# Patient Record
Sex: FEMALE | Race: WHITE | NOT HISPANIC OR LATINO | Employment: UNEMPLOYED | URBAN - METROPOLITAN AREA
[De-identification: names, ages, dates, MRNs, and addresses within clinical notes are randomized per-mention and may not be internally consistent; named-entity substitution may affect disease eponyms.]

---
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
## 2023-02-16 RX ORDER — NORETHINDRONE ACETATE AND ETHINYL ESTRADIOL 1.5-30(21)
1 KIT ORAL DAILY
Qty: 28 TABLET | Refills: 1 | Status: SHIPPED | OUTPATIENT
Start: 2023-02-16 | End: 2023-03-21

## 2023-03-21 ENCOUNTER — OFFICE VISIT (OUTPATIENT)
Dept: OBSTETRICS AND GYNECOLOGY | Facility: CLINIC | Age: 18
End: 2023-03-21
Payer: MEDICAID

## 2023-03-21 VITALS
TEMPERATURE: 97 F | BODY MASS INDEX: 22.66 KG/M2 | DIASTOLIC BLOOD PRESSURE: 82 MMHG | SYSTOLIC BLOOD PRESSURE: 118 MMHG | HEIGHT: 61 IN | WEIGHT: 120 LBS

## 2023-03-21 DIAGNOSIS — Z30.9 ENCOUNTER FOR CONTRACEPTIVE MANAGEMENT, UNSPECIFIED TYPE: Primary | ICD-10-CM

## 2023-03-21 DIAGNOSIS — Z30.41 ORAL CONTRACEPTIVE USE: ICD-10-CM

## 2023-03-21 PROCEDURE — 1160F PR REVIEW ALL MEDS BY PRESCRIBER/CLIN PHARMACIST DOCUMENTED: ICD-10-PCS | Mod: CPTII,,, | Performed by: NURSE PRACTITIONER

## 2023-03-21 PROCEDURE — 1159F MED LIST DOCD IN RCRD: CPT | Mod: CPTII,,, | Performed by: NURSE PRACTITIONER

## 2023-03-21 PROCEDURE — 3079F DIAST BP 80-89 MM HG: CPT | Mod: CPTII,,, | Performed by: NURSE PRACTITIONER

## 2023-03-21 PROCEDURE — 3079F PR MOST RECENT DIASTOLIC BLOOD PRESSURE 80-89 MM HG: ICD-10-PCS | Mod: CPTII,,, | Performed by: NURSE PRACTITIONER

## 2023-03-21 PROCEDURE — 99395 PREV VISIT EST AGE 18-39: CPT | Mod: ,,, | Performed by: NURSE PRACTITIONER

## 2023-03-21 PROCEDURE — 3074F PR MOST RECENT SYSTOLIC BLOOD PRESSURE < 130 MM HG: ICD-10-PCS | Mod: CPTII,,, | Performed by: NURSE PRACTITIONER

## 2023-03-21 PROCEDURE — 3008F PR BODY MASS INDEX (BMI) DOCUMENTED: ICD-10-PCS | Mod: CPTII,,, | Performed by: NURSE PRACTITIONER

## 2023-03-21 PROCEDURE — 3008F BODY MASS INDEX DOCD: CPT | Mod: CPTII,,, | Performed by: NURSE PRACTITIONER

## 2023-03-21 PROCEDURE — 1160F RVW MEDS BY RX/DR IN RCRD: CPT | Mod: CPTII,,, | Performed by: NURSE PRACTITIONER

## 2023-03-21 PROCEDURE — 1159F PR MEDICATION LIST DOCUMENTED IN MEDICAL RECORD: ICD-10-PCS | Mod: CPTII,,, | Performed by: NURSE PRACTITIONER

## 2023-03-21 PROCEDURE — 3074F SYST BP LT 130 MM HG: CPT | Mod: CPTII,,, | Performed by: NURSE PRACTITIONER

## 2023-03-21 PROCEDURE — 99395 PR PREVENTIVE VISIT,EST,18-39: ICD-10-PCS | Mod: ,,, | Performed by: NURSE PRACTITIONER

## 2023-03-21 RX ORDER — NORELGESTROMIN AND ETHINYL ESTRADIOL 35; 150 UG/MG; UG/MG
1 PATCH TRANSDERMAL WEEKLY
Qty: 3 PATCH | Refills: 2 | Status: SHIPPED | OUTPATIENT
Start: 2023-03-21 | End: 2023-05-11 | Stop reason: SDUPTHER

## 2023-05-11 ENCOUNTER — OFFICE VISIT (OUTPATIENT)
Dept: OBSTETRICS AND GYNECOLOGY | Facility: CLINIC | Age: 18
End: 2023-05-11
Payer: MEDICAID

## 2023-05-11 VITALS
WEIGHT: 116.19 LBS | TEMPERATURE: 98 F | HEIGHT: 61 IN | BODY MASS INDEX: 21.94 KG/M2 | DIASTOLIC BLOOD PRESSURE: 68 MMHG | SYSTOLIC BLOOD PRESSURE: 98 MMHG

## 2023-05-11 DIAGNOSIS — Z11.3 SCREEN FOR SEXUALLY TRANSMITTED DISEASES: ICD-10-CM

## 2023-05-11 DIAGNOSIS — Z01.419 ROUTINE GYNECOLOGICAL EXAMINATION: Primary | ICD-10-CM

## 2023-05-11 DIAGNOSIS — Z30.45 ENCOUNTER FOR SURVEILLANCE OF TRANSDERMAL PATCH HORMONAL CONTRACEPTIVE DEVICE: ICD-10-CM

## 2023-05-11 PROCEDURE — 3008F PR BODY MASS INDEX (BMI) DOCUMENTED: ICD-10-PCS | Mod: CPTII,,, | Performed by: NURSE PRACTITIONER

## 2023-05-11 PROCEDURE — 3074F SYST BP LT 130 MM HG: CPT | Mod: CPTII,,, | Performed by: NURSE PRACTITIONER

## 2023-05-11 PROCEDURE — 99395 PREV VISIT EST AGE 18-39: CPT | Mod: ,,, | Performed by: NURSE PRACTITIONER

## 2023-05-11 PROCEDURE — 1160F RVW MEDS BY RX/DR IN RCRD: CPT | Mod: CPTII,,, | Performed by: NURSE PRACTITIONER

## 2023-05-11 PROCEDURE — 3008F BODY MASS INDEX DOCD: CPT | Mod: CPTII,,, | Performed by: NURSE PRACTITIONER

## 2023-05-11 PROCEDURE — 1159F PR MEDICATION LIST DOCUMENTED IN MEDICAL RECORD: ICD-10-PCS | Mod: CPTII,,, | Performed by: NURSE PRACTITIONER

## 2023-05-11 PROCEDURE — 1160F PR REVIEW ALL MEDS BY PRESCRIBER/CLIN PHARMACIST DOCUMENTED: ICD-10-PCS | Mod: CPTII,,, | Performed by: NURSE PRACTITIONER

## 2023-05-11 PROCEDURE — 3078F PR MOST RECENT DIASTOLIC BLOOD PRESSURE < 80 MM HG: ICD-10-PCS | Mod: CPTII,,, | Performed by: NURSE PRACTITIONER

## 2023-05-11 PROCEDURE — 3078F DIAST BP <80 MM HG: CPT | Mod: CPTII,,, | Performed by: NURSE PRACTITIONER

## 2023-05-11 PROCEDURE — 3074F PR MOST RECENT SYSTOLIC BLOOD PRESSURE < 130 MM HG: ICD-10-PCS | Mod: CPTII,,, | Performed by: NURSE PRACTITIONER

## 2023-05-11 PROCEDURE — 99395 PR PREVENTIVE VISIT,EST,18-39: ICD-10-PCS | Mod: ,,, | Performed by: NURSE PRACTITIONER

## 2023-05-11 PROCEDURE — 1159F MED LIST DOCD IN RCRD: CPT | Mod: CPTII,,, | Performed by: NURSE PRACTITIONER

## 2023-05-11 RX ORDER — NORELGESTROMIN AND ETHINYL ESTRADIOL 35; 150 UG/MG; UG/MG
1 PATCH TRANSDERMAL WEEKLY
Qty: 3 PATCH | Refills: 11 | Status: SHIPPED | OUTPATIENT
Start: 2023-05-11 | End: 2024-02-01

## 2023-05-11 NOTE — PROGRESS NOTES
Chief Complaint: Annual exam    Chief Complaint   Patient presents with    Well Woman       HPI:   17 y.o. F  presents for an annual gyn exam.    Currently using xulane patch for contraception with no complaints.    LMP: 2023  Gardasil: x2    FmHx: MGM with ovarian cancer, negative for breast, uterine, and colon cancers.     LMP:  23  Frequency: q month   Cycle Length: 5-7 days   Flow:moderate   Intermenstrual Bleeding: No  Postcoital Bleeding: No  Dysmenorrhea: Yes  Sexually Active: yes   Dyspareunia: No  Contraception: Xulane patches   H/o STI: No   Last pap: no hx  Gardasil: x2             Family History   Problem Relation Age of Onset    Ovarian cancer Maternal Grandmother     Breast cancer Neg Hx     Uterine cancer Neg Hx     Colon cancer Neg Hx        History reviewed. No pertinent past medical history.  History reviewed. No pertinent surgical history.    Current Outpatient Medications:     norelgestromin-ethinyl estradiol 150-35 mcg/24 hr, Place 1 patch onto the skin once a week. Apply 1 patch by transdermal route weekly for 3 weeks monthly, Disp: 3 patch, Rfl: 2    Review of patient's allergies indicates:  No Known Allergies    Social History     Tobacco Use    Smoking status: Never    Smokeless tobacco: Never   Substance Use Topics    Alcohol use: Not Currently    Drug use: Never       Review of Systems   Constitutional:  Negative for appetite change, chills, fatigue, fever and unexpected weight change.   Gastrointestinal:  Negative for abdominal pain, blood in stool, constipation, diarrhea, nausea, vomiting and reflux.   Genitourinary:  Negative for bladder incontinence, decreased libido, dysmenorrhea, dyspareunia, dysuria, flank pain, frequency, genital sores, hematuria, hot flashes, menorrhagia, menstrual problem, pelvic pain, urgency, vaginal bleeding, vaginal discharge, vaginal pain, urinary incontinence, postcoital bleeding, postmenopausal bleeding, vaginal dryness and vaginal odor.  "  Integumentary:  Negative for rash, acne, hair changes and mole/lesion.   Neurological:  Negative for headaches.      Physical Exam:   Vitals:    05/11/23 1105   BP: 98/68   BP Location: Right arm   Temp: 97.5 °F (36.4 °C)   Weight: 52.7 kg (116 lb 3.2 oz)   Height: 5' 1.42" (1.56 m)       Body mass index is 21.66 kg/m².    Physical Exam  Constitutional:       Appearance: She is well-developed.   Neck:      Thyroid: No thyroid mass or thyroid tenderness.   Cardiovascular:      Rate and Rhythm: Normal rate and regular rhythm.      Pulses: Normal pulses.      Heart sounds: Normal heart sounds. No murmur heard.  Pulmonary:      Effort: No respiratory distress.      Breath sounds: Normal breath sounds. No decreased breath sounds, wheezing, rhonchi or rales.   Abdominal:      General: Bowel sounds are normal.      Palpations: There is no mass.      Tenderness: There is no abdominal tenderness.      Hernia: No hernia is present.   Musculoskeletal:      Cervical back: No edema.      Right lower leg: No edema.      Left lower leg: No edema.   Lymphadenopathy:      Head:      Right side of head: No submandibular or preauricular adenopathy.      Left side of head: No submandibular or preauricular adenopathy.      Upper Body:      Right upper body: No supraclavicular or axillary adenopathy.      Left upper body: No supraclavicular or axillary adenopathy.   Skin:     General: Skin is warm and dry.      Coloration: Skin is not pale.      Findings: No erythema or rash.   Neurological:      Mental Status: She is alert and oriented to person, place, and time.   Psychiatric:         Mood and Affect: Mood normal. Mood is not anxious or depressed.         Behavior: Behavior normal.         Thought Content: Thought content normal. Thought content does not include homicidal or suicidal ideation. Thought content does not include homicidal or suicidal plan.         Assessment:     There is no problem list on file for this " patient.      Health Maintenance Due   Topic Date Due    Hepatitis C Screening  Never done    Lipid Panel  Never done    COVID-19 Vaccine (1) Never done    HIV Screening  Never done    Chlamydia Screening  Never done     Health Maintenance Topics with due status: Not Due       Topic Last Completion Date    TETANUS VACCINE 07/29/2014    Influenza Vaccine 01/15/2015         Plan:    Laury was seen today for well woman.    Diagnoses and all orders for this visit:    Routine gynecological examination  No PAP  Uro Swab GC/CZ/TV  Counseled regarding safe sex practices and prevention of STD's  Discussed contraceptive options.  Discussed HPV vaccine  Advised avoidance of tobacco, alcohol, and illicit drug use  Seat belt  RTC 1 yr   Encounter for surveillance of transdermal patch hormonal contraceptive device  Renew xulane  - Explained common options for contraception including natural family planning, barrier methods, depo-provera, ocps,  patch, nuvaring, IUD, Nexplanon, and sterilization.     - Discussed that pills should be taken at the same time every day to minimize breakthrough bleeding and to decrease failure rate.     - Advised patient that smoking is harmful due to increased risks of stroke, heart attack and blood clots when taking pills. Patient to contact us immediately if she experiences severe abdominal pain, severe chest pain, severe headaches, eye-visual changes, severe leg pain or SOB.     - Discussed that birth control, such as pills, Nuva Ring , Patch or Depo Provera, Nexplanon, IUDs do not protect against STDs.     Pt is content with xulane. Refills sent to pharmacy.

## 2023-05-16 ENCOUNTER — TELEPHONE (OUTPATIENT)
Dept: OBSTETRICS AND GYNECOLOGY | Facility: CLINIC | Age: 18
End: 2023-05-16

## 2023-05-16 NOTE — PROGRESS NOTES
+ chlamydia, please notify patient, she and partner should be treated with Doxycycline 100 mg po bid x 7 days unless pregnant, MARISA 4 weeks

## 2023-05-16 NOTE — TELEPHONE ENCOUNTER
----- Message from Lisy Rico NP sent at 5/16/2023  4:03 PM CDT -----  + chlamydia, please notify patient, she and partner should be treated with Doxycycline 100 mg po bid x 7 days unless pregnant, MARISA 4 weeks

## 2023-05-17 ENCOUNTER — PATIENT MESSAGE (OUTPATIENT)
Dept: OBSTETRICS AND GYNECOLOGY | Facility: CLINIC | Age: 18
End: 2023-05-17
Payer: MEDICAID

## 2023-05-17 DIAGNOSIS — A74.9 CHLAMYDIA: Primary | ICD-10-CM

## 2023-05-17 RX ORDER — DOXYCYCLINE 100 MG/1
100 CAPSULE ORAL 2 TIMES DAILY
Qty: 14 CAPSULE | Refills: 0 | Status: SHIPPED | OUTPATIENT
Start: 2023-05-17 | End: 2023-05-24

## 2023-05-17 NOTE — TELEPHONE ENCOUNTER
Will call in meds per SL treating pt and partner. [facility] info [address]. [facility] [place]

## 2023-06-28 NOTE — PROGRESS NOTES
Chief Complaint:     Chief Complaint   Patient presents with    MARISA for +Chlamydia.  Treated 23.  LMP: 23         HPI:   17 y.o.  presents for a MARISA on +CZ on 2023. Treated on 2023 with Doxy 100mg BID x2 weeks.     LMP:  23  Frequency: q month   Cycle Length: 5-7 days   Flow:moderate   Intermenstrual Bleeding: No  Postcoital Bleeding: No  Dysmenorrhea: Yes, Mild  Sexually Active: yes   Dyspareunia: No  Contraception: Xulane patches   H/o STI: Chlamydia  Last pap: no hx  Gardasil: 2/2               Current Outpatient Medications:     norelgestromin-ethinyl estradiol 150-35 mcg/24 hr, Place 1 patch onto the skin once a week. Apply 1 patch by transdermal route weekly for 3 weeks monthly, Disp: 3 patch, Rfl: 11    Review of patient's allergies indicates:  No Known Allergies    Social History     Tobacco Use    Smoking status: Never     Passive exposure: Current    Smokeless tobacco: Never   Substance Use Topics    Alcohol use: Yes    Drug use: Never       Review of Systems   Constitutional:  Negative for appetite change, chills, fatigue, fever and unexpected weight change.   Gastrointestinal:  Negative for abdominal pain, blood in stool, constipation, diarrhea, nausea, vomiting and reflux.   Genitourinary:  Negative for bladder incontinence, decreased libido, dysmenorrhea, dyspareunia, dysuria, flank pain, frequency, genital sores, hematuria, hot flashes, menorrhagia, menstrual problem, pelvic pain, urgency, vaginal bleeding, vaginal discharge, vaginal pain, urinary incontinence, postcoital bleeding, postmenopausal bleeding, vaginal dryness and vaginal odor.   Integumentary:  Negative for rash, acne, hair changes and mole/lesion.   Neurological:  Negative for headaches.        Physical Exam:   Vitals:    23 1101   BP: 96/68   BP Location: Left arm   Patient Position: Sitting   BP Method: Medium (Manual)   Temp: 97.7 °F (36.5 °C)   TempSrc: Temporal   Weight: 50.4 kg (111 lb 1.8  "oz)   Height: 5' 1.42" (1.56 m)       Body mass index is 20.71 kg/m².    Physical Exam  Constitutional:       Appearance: She is well-developed.   Abdominal:      General: Abdomen is flat. Bowel sounds are normal. There is no distension.      Palpations: Abdomen is soft. There is no mass.      Tenderness: There is no abdominal tenderness.      Hernia: No hernia is present.   Genitourinary:     Vagina: No vaginal discharge, erythema, tenderness or bleeding.      Cervix: No cervical motion tenderness or discharge.      Uterus: Not enlarged and not tender.       Adnexa:         Right: No mass, tenderness or fullness.          Left: No mass, tenderness or fullness.     Neurological:      Mental Status: She is alert and oriented to person, place, and time.   Psychiatric:         Attention and Perception: Attention normal.         Mood and Affect: Mood normal.         Assessment:     There is no problem list on file for this patient.      Health Maintenance Due   Topic Date Due    Hepatitis C Screening  Never done    Lipid Panel  Never done    COVID-19 Vaccine (1) Never done    HIV Screening  Never done    Chlamydia Screening  Never done     Health Maintenance Topics with due status: Not Due       Topic Last Completion Date    TETANUS VACCINE 07/29/2014    Influenza Vaccine 01/15/2015           Plan:    Laury was seen today for marisa for +chlamydia.  treated 5/16/23.  lmp: 6/23/23.    Diagnoses and all orders for this visit:    Chlamydia  -     MDL Sendout Test    Tested +: 05/11/2023 +     Treated: 05/25/2023 Treated with Doxy 100mg     MARISA today: One Swab GC/CZ/TV            "

## 2023-06-29 ENCOUNTER — OFFICE VISIT (OUTPATIENT)
Dept: OBSTETRICS AND GYNECOLOGY | Facility: CLINIC | Age: 18
End: 2023-06-29
Payer: MEDICAID

## 2023-06-29 VITALS
WEIGHT: 111.13 LBS | TEMPERATURE: 98 F | DIASTOLIC BLOOD PRESSURE: 68 MMHG | HEIGHT: 61 IN | SYSTOLIC BLOOD PRESSURE: 96 MMHG | BODY MASS INDEX: 20.98 KG/M2

## 2023-06-29 DIAGNOSIS — A74.9 CHLAMYDIA: Primary | ICD-10-CM

## 2023-06-29 PROCEDURE — 3008F BODY MASS INDEX DOCD: CPT | Mod: CPTII,,, | Performed by: NURSE PRACTITIONER

## 2023-06-29 PROCEDURE — 1159F MED LIST DOCD IN RCRD: CPT | Mod: CPTII,,, | Performed by: NURSE PRACTITIONER

## 2023-06-29 PROCEDURE — 3008F PR BODY MASS INDEX (BMI) DOCUMENTED: ICD-10-PCS | Mod: CPTII,,, | Performed by: NURSE PRACTITIONER

## 2023-06-29 PROCEDURE — 99213 PR OFFICE/OUTPT VISIT, EST, LEVL III, 20-29 MIN: ICD-10-PCS | Mod: ,,, | Performed by: NURSE PRACTITIONER

## 2023-06-29 PROCEDURE — 3078F PR MOST RECENT DIASTOLIC BLOOD PRESSURE < 80 MM HG: ICD-10-PCS | Mod: CPTII,,, | Performed by: NURSE PRACTITIONER

## 2023-06-29 PROCEDURE — 1159F PR MEDICATION LIST DOCUMENTED IN MEDICAL RECORD: ICD-10-PCS | Mod: CPTII,,, | Performed by: NURSE PRACTITIONER

## 2023-06-29 PROCEDURE — 3074F SYST BP LT 130 MM HG: CPT | Mod: CPTII,,, | Performed by: NURSE PRACTITIONER

## 2023-06-29 PROCEDURE — 1160F RVW MEDS BY RX/DR IN RCRD: CPT | Mod: CPTII,,, | Performed by: NURSE PRACTITIONER

## 2023-06-29 PROCEDURE — 3074F PR MOST RECENT SYSTOLIC BLOOD PRESSURE < 130 MM HG: ICD-10-PCS | Mod: CPTII,,, | Performed by: NURSE PRACTITIONER

## 2023-06-29 PROCEDURE — 1160F PR REVIEW ALL MEDS BY PRESCRIBER/CLIN PHARMACIST DOCUMENTED: ICD-10-PCS | Mod: CPTII,,, | Performed by: NURSE PRACTITIONER

## 2023-06-29 PROCEDURE — 99213 OFFICE O/P EST LOW 20 MIN: CPT | Mod: ,,, | Performed by: NURSE PRACTITIONER

## 2023-06-29 PROCEDURE — 3078F DIAST BP <80 MM HG: CPT | Mod: CPTII,,, | Performed by: NURSE PRACTITIONER

## 2023-08-18 NOTE — PROGRESS NOTES
Chief Complaint     Absent Menses (UPT confirmation)    HPI:     Patient is a 18 y.o.  presents today for UPT confirmation   UPT today in clinic: positive     LMP: 23  Frequency: q 28 days   Cycle Length: 5-7 days   Flow:moderate   Intermenstrual Bleeding: No  Postcoital Bleeding: No  Dysmenorrhea: Yes, Mild  Sexually Active: yes   Dyspareunia: No  Contraception: none  H/o STI: Chlamydia  Last pap: no hx  Gardasil: 2/3          Past Medical History:   Diagnosis Date    Chlamydia infection        History reviewed. No pertinent surgical history.    Family History   Problem Relation Age of Onset    Ovarian cancer Maternal Grandmother     Breast cancer Neg Hx     Uterine cancer Neg Hx     Colon cancer Neg Hx     Cervical cancer Neg Hx        OB History          0    Para   0    Term   0       0    AB   0    Living   0         SAB   0    IAB   0    Ectopic   0    Multiple   0    Live Births   0                 Current Outpatient Medications on File Prior to Visit   Medication Sig Dispense Refill    prenatal no115/iron/folic acid (PRENATAL 19 ORAL) Take 1 tablet by mouth once daily.      norelgestromin-ethinyl estradiol 150-35 mcg/24 hr Place 1 patch onto the skin once a week. Apply 1 patch by transdermal route weekly for 3 weeks monthly (Patient not taking: Reported on 2023) 3 patch 11     No current facility-administered medications on file prior to visit.       Review of Systems:       Review of Systems   Constitutional:  Negative for chills and fever.   Gastrointestinal:  Negative for abdominal pain, constipation and diarrhea.   Genitourinary:  Negative for bladder incontinence, decreased libido, dysmenorrhea, dyspareunia, dysuria, flank pain, frequency, genital sores, hematuria, hot flashes, menorrhagia, menstrual problem, pelvic pain, urgency, vaginal bleeding, vaginal discharge, vaginal pain, urinary incontinence, postcoital bleeding, postmenopausal bleeding, vaginal dryness and  "vaginal odor.        Physical Exam:    /66 (BP Location: Left arm, Patient Position: Sitting)   Ht 5' 1" (1.549 m)   Wt 51.2 kg (112 lb 12.8 oz)   LMP 07/14/2023 (Exact Date)   BMI 21.31 kg/m²     Physical Exam     deferred  Assessment:   1. Positive pregnancy test    2. Missed menses  -     POCT Urine Pregnancy             Plan:     Prenatal counseling  Tobacco avoidance/cessation  Illicit drug avoidance  PNV, folic acid     RTC New OB 3 weeks      "

## 2023-08-22 ENCOUNTER — OFFICE VISIT (OUTPATIENT)
Dept: OBSTETRICS AND GYNECOLOGY | Facility: CLINIC | Age: 18
End: 2023-08-22
Payer: MEDICAID

## 2023-08-22 VITALS
WEIGHT: 112.81 LBS | DIASTOLIC BLOOD PRESSURE: 66 MMHG | SYSTOLIC BLOOD PRESSURE: 112 MMHG | BODY MASS INDEX: 21.3 KG/M2 | HEIGHT: 61 IN

## 2023-08-22 DIAGNOSIS — N92.6 MISSED MENSES: ICD-10-CM

## 2023-08-22 DIAGNOSIS — Z32.01 POSITIVE PREGNANCY TEST: Primary | ICD-10-CM

## 2023-08-22 LAB
B-HCG UR QL: POSITIVE
CTP QC/QA: YES

## 2023-08-22 PROCEDURE — 81025 URINE PREGNANCY TEST: CPT | Mod: ,,, | Performed by: OBSTETRICS & GYNECOLOGY

## 2023-08-22 PROCEDURE — 3078F DIAST BP <80 MM HG: CPT | Mod: CPTII,,, | Performed by: OBSTETRICS & GYNECOLOGY

## 2023-08-22 PROCEDURE — 1159F PR MEDICATION LIST DOCUMENTED IN MEDICAL RECORD: ICD-10-PCS | Mod: CPTII,,, | Performed by: OBSTETRICS & GYNECOLOGY

## 2023-08-22 PROCEDURE — 81025 POCT URINE PREGNANCY: ICD-10-PCS | Mod: ,,, | Performed by: OBSTETRICS & GYNECOLOGY

## 2023-08-22 PROCEDURE — 99203 OFFICE O/P NEW LOW 30 MIN: CPT | Mod: TH,,, | Performed by: OBSTETRICS & GYNECOLOGY

## 2023-08-22 PROCEDURE — 3008F BODY MASS INDEX DOCD: CPT | Mod: CPTII,,, | Performed by: OBSTETRICS & GYNECOLOGY

## 2023-08-22 PROCEDURE — 3074F PR MOST RECENT SYSTOLIC BLOOD PRESSURE < 130 MM HG: ICD-10-PCS | Mod: CPTII,,, | Performed by: OBSTETRICS & GYNECOLOGY

## 2023-08-22 PROCEDURE — 1159F MED LIST DOCD IN RCRD: CPT | Mod: CPTII,,, | Performed by: OBSTETRICS & GYNECOLOGY

## 2023-08-22 PROCEDURE — 3074F SYST BP LT 130 MM HG: CPT | Mod: CPTII,,, | Performed by: OBSTETRICS & GYNECOLOGY

## 2023-08-22 PROCEDURE — 3008F PR BODY MASS INDEX (BMI) DOCUMENTED: ICD-10-PCS | Mod: CPTII,,, | Performed by: OBSTETRICS & GYNECOLOGY

## 2023-08-22 PROCEDURE — 3078F PR MOST RECENT DIASTOLIC BLOOD PRESSURE < 80 MM HG: ICD-10-PCS | Mod: CPTII,,, | Performed by: OBSTETRICS & GYNECOLOGY

## 2023-08-22 PROCEDURE — 99203 PR OFFICE/OUTPT VISIT, NEW, LEVL III, 30-44 MIN: ICD-10-PCS | Mod: TH,,, | Performed by: OBSTETRICS & GYNECOLOGY

## 2023-09-04 ENCOUNTER — PATIENT MESSAGE (OUTPATIENT)
Dept: OBSTETRICS AND GYNECOLOGY | Facility: CLINIC | Age: 18
End: 2023-09-04
Payer: MEDICAID

## 2023-09-05 ENCOUNTER — TELEPHONE (OUTPATIENT)
Dept: OBSTETRICS AND GYNECOLOGY | Facility: CLINIC | Age: 18
End: 2023-09-05
Payer: MEDICAID

## 2023-09-06 ENCOUNTER — HOSPITAL ENCOUNTER (EMERGENCY)
Facility: HOSPITAL | Age: 18
Discharge: HOME | End: 2023-09-07
Attending: FAMILY MEDICINE
Payer: MEDICAID

## 2023-09-06 VITALS
RESPIRATION RATE: 18 BRPM | WEIGHT: 112 LBS | HEIGHT: 60 IN | DIASTOLIC BLOOD PRESSURE: 74 MMHG | BODY MASS INDEX: 21.99 KG/M2 | SYSTOLIC BLOOD PRESSURE: 125 MMHG | HEART RATE: 91 BPM | OXYGEN SATURATION: 98 % | TEMPERATURE: 99 F

## 2023-09-06 DIAGNOSIS — O20.9 VAGINAL BLEEDING IN PREGNANCY, FIRST TRIMESTER: Primary | ICD-10-CM

## 2023-09-06 PROCEDURE — 81001 URINALYSIS AUTO W/SCOPE: CPT | Performed by: FAMILY MEDICINE

## 2023-09-06 PROCEDURE — 84702 CHORIONIC GONADOTROPIN TEST: CPT | Performed by: FAMILY MEDICINE

## 2023-09-06 PROCEDURE — 36415 COLL VENOUS BLD VENIPUNCTURE: CPT | Performed by: FAMILY MEDICINE

## 2023-09-06 PROCEDURE — 99284 EMERGENCY DEPT VISIT MOD MDM: CPT

## 2023-09-07 LAB
APPEARANCE UR: CLEAR
B-HCG FREE SERPL-ACNC: NORMAL MIU/ML
BACTERIA #/AREA URNS AUTO: ABNORMAL /HPF
BILIRUB UR QL STRIP.AUTO: NEGATIVE
COLOR UR: YELLOW
GLUCOSE UR QL STRIP.AUTO: NEGATIVE
KETONES UR QL STRIP.AUTO: NEGATIVE
LEUKOCYTE ESTERASE UR QL STRIP.AUTO: NEGATIVE
NITRITE UR QL STRIP.AUTO: NEGATIVE
PH UR STRIP.AUTO: 6.5 [PH]
PROT UR QL STRIP.AUTO: NEGATIVE
RBC #/AREA URNS AUTO: ABNORMAL /HPF
RBC UR QL AUTO: ABNORMAL
SP GR UR STRIP.AUTO: 1.01 (ref 1–1.03)
SQUAMOUS #/AREA URNS AUTO: ABNORMAL /HPF
UROBILINOGEN UR STRIP-ACNC: 1
WBC #/AREA URNS AUTO: ABNORMAL /HPF

## 2023-09-07 NOTE — PROGRESS NOTES
Chief Complaint:  Initial Prenatal Visit (LMP 23)    History of Present Illness:  Laury Garrett is a 18 y.o. year old  presents for her new ob, 8w3d by TVUS done at [facility] on 23- 7w6d, TAHIRA 24. Seen at ER due to vaginal bleeding following intercourse. Denies pain,  bleeding since.     23  FINDINGS:  A gestational sac with a normal appearing decidual reaction is noted high within the endometrial cavity .  A yolk sac and fetal pole is noted within the gestational sac with a crown-rump length of 1.48 cm (7 weeks 6 days).  The fetus was reported to demonstrate normal spontaneous motility with a fetal heart rate of 157 beats per minute.  No definite fetal or maternal abnormalities are noted.  Impression:  1. A single intrauterine pregnancy is present with a mean sonographic gestational age of 7 weeks 6 days.  No significant abnormalities are noted.       Review of Systems:  General/Constitutional: Chills denies. Fatigue/weakness denies. Fever denies. Night sweats denies. Hot flashes denies.   Respiratory: Cough denies. Hemoptysis denies. SOB denies. Sputum production denies. Wheezing denies.   Cardiovascular: Chest pain denies. Dizziness denies. Palpitations denies. Swelling in hands/feet denies.    Gastrointestinal: Abdominal pain denies. Blood in stool denies. Constipation denies. Diarrhea denies. Heartburn denies. Nausea denies. Vomiting denies.   Genitourinary: Incontinence denies. Blood in urine denies. Frequent urination denies. Painful urination denies.  Urinary urgency denies.  Nocturia denies.   Gynecologic: Irregular menses denies. Heavy bleeding denies. Painful menses denies. Vaginal discharge denies. Vaginal odor denies. Vaginal itching denies. Vaginal lesion denies.  Pelvic pain denies. Decreased libido denies. Vulvar lesion denies. Prolapse of genital organs denies. Painful intercourse denies. Postcoital bleeding denies.   Psychiatric: Depression denies. Anxiety denies.     OB  History    Para Term  AB Living   1 0 0 0 0 0   SAB IAB Ectopic Multiple Live Births   0 0 0 0 0      # 1 - Date: None, Sex: None, Weight: None, GA: None, Delivery: None, Apgar1: None, Apgar5: None, Living: None, Birth Comments: None        Past Medical History:   Diagnosis Date    Chlamydia infection          Current Outpatient Medications:     prenatal no115/iron/folic acid (PRENATAL 19 ORAL), Take 1 tablet by mouth once daily., Disp: , Rfl:     norelgestromin-ethinyl estradiol 150-35 mcg/24 hr, Place 1 patch onto the skin once a week. Apply 1 patch by transdermal route weekly for 3 weeks monthly (Patient not taking: Reported on 2023), Disp: 3 patch, Rfl: 11    Review of patient's allergies indicates:  No Known Allergies    History reviewed. No pertinent surgical history.    Family History   Problem Relation Age of Onset    Ovarian cancer Maternal Grandmother     Breast cancer Neg Hx     Uterine cancer Neg Hx     Colon cancer Neg Hx     Cervical cancer Neg Hx        Social History     Socioeconomic History    Marital status: Single   Tobacco Use    Smoking status: Never     Passive exposure: Current    Smokeless tobacco: Never   Substance and Sexual Activity    Alcohol use: Not Currently    Drug use: Never    Sexual activity: Yes     Partners: Male     Birth control/protection: None     Comment: STI : Chlamydia       Physical Exam:  /64 (BP Location: Right arm, Patient Position: Sitting, BP Method: Medium (Manual))   Temp 97.2 °F (36.2 °C) (Temporal)   LMP 2023 (Approximate)     Chaperone: present.       General appearance: healthy, well-nourished and well-developed     Psychiatric: Orientation to time, place and person. Normal mood and affect and active, alert     Skin:   Appearance: no rashes or lesions.     Neck:   Neck: supple, FROM, trachea midline. and no masses   Thyroid: no enlargement or nodules and non-tender.       Cardiovascular:   Auscultation: RRR and no murmur.    Peripheral Vascular: no varicosities, LLE edema, RLE edema, calf tenderness, and palpable cord and pedal pulses intact.     Lungs:   Respiratory effort: no intercostal retractions or accessory muscle usage.   Auscultation: no wheezing, rales/crackles, or rhonchi and clear to auscultation.     Abdomen:   Auscultation/Inspection/Palpation: no hepatomegaly, splenomegaly, masses, tenderness or CVA tenderness and soft, non-distended bowel sounds preset.    Hernia: no palpable hernias.       TVUS:  IUP W CRL: 8w3d  TAHIRA:4/19/24 by US   FHT: 160s      Assessment/Plan:  1. Initial obstetric visit in first trimester  -     US OB/GYN Procedure ([facility]) - Extended List  -     POCT Urinalysis, Dipstick, Automated, W/O Scope    2. 8 weeks gestation of pregnancy           Prenatal counseling  Discussed appropriate weight gain for pregnancy  Tobacco avoidance/cessation  Illicit drug avoidance  PNL  PNV  GC/CZ/TV urine collected   RTC 4 weeks.

## 2023-09-07 NOTE — ED PROVIDER NOTES
Encounter Date: 2023       History     Chief Complaint   Patient presents with    Vaginal Bleeding     Patient states she is pregnant [id number]. Pt complains of heavy bleeding and abdominal since 21:30 tonight.      Patient presents to the emergency room with vaginal spotting that has been going on for about 2 days.  A little more this evening along with some pelvic cramping so she came to the emergency room.  She is approximately 8 weeks pregnant by dates,  with pregnancy confirmed in clinic Dr. Hilario.    The history is provided by the patient.     Review of patient's allergies indicates:  No Known Allergies  Past Medical History:   Diagnosis Date    Chlamydia infection      History reviewed. No pertinent surgical history.  Family History   Problem Relation Age of Onset    Ovarian cancer Maternal Grandmother     Breast cancer Neg Hx     Uterine cancer Neg Hx     Colon cancer Neg Hx     Cervical cancer Neg Hx      Social History     Tobacco Use    Smoking status: Never     Passive exposure: Current    Smokeless tobacco: Never   Substance Use Topics    Alcohol use: Yes    Drug use: Never     Review of Systems   Constitutional:  Negative for fever.   HENT:  Negative for sore throat.    Respiratory:  Negative for shortness of breath.    Cardiovascular:  Negative for chest pain.   Gastrointestinal:  Negative for nausea.   Genitourinary:  Positive for pelvic pain and vaginal bleeding. Negative for dysuria.   Musculoskeletal:  Negative for back pain.   Skin:  Negative for rash.   Neurological:  Negative for weakness.   Hematological:  Does not bruise/bleed easily.   All other systems reviewed and are negative.      Physical Exam     Initial Vitals [23 2312]   BP Pulse Resp Temp SpO2   125/74 91 18 99.1 °F (37.3 °C) 98 %      MAP       --         Physical Exam    Nursing note and vitals reviewed.  Constitutional: She appears well-developed and well-nourished.   HENT:   Head: Normocephalic and atraumatic.    Eyes: EOM are normal. Pupils are equal, round, and reactive to light.   Neck: Neck supple.   Normal range of motion.  Cardiovascular:  Normal rate, regular rhythm and normal heart sounds.           Pulmonary/Chest: Breath sounds normal.   Abdominal: Abdomen is soft. Bowel sounds are normal. There is no abdominal tenderness.   Musculoskeletal:         General: No edema. Normal range of motion.      Cervical back: Normal range of motion and neck supple.     Neurological: She is alert and oriented to person, place, and time.   Skin: Skin is warm and dry. Capillary refill takes less than 2 seconds.   Psychiatric: She has a normal mood and affect.         ED Course   Procedures  Labs Reviewed   URINALYSIS - Abnormal; Notable for the following components:       Result Value    Blood, UA Large (*)     All other components within normal limits    Narrative:      URINE STABILITY IS 2 HOURS AT ROOM TEMP OR    SIX HOURS REFRIGERATED. PERFORMING TESTING ON    SPECIMENS GREATER THAN THIS AGE MAY AFFECT THE    FOLLOWING TESTS:    PH          SPECIFIC GRAVITY           BLOOD    CLARITY     BILIRUBIN               UROBILINOGEN   URINALYSIS, MICROSCOPIC - Abnormal; Notable for the following components:    RBC, UA 6-10 (*)     Squamous Epithelial Cells, UA Few (*)     All other components within normal limits   HCG, QUANTITATIVE    Narrative:     Beta-HCG ref range weeks after implantation (mIU/mL):   3-4wks 9-130   4-5wks    5-6wks [id number]   6-7wks [id number]   7-12wks [id number]   12-16wks [id number]   16-28wks 1400-38796   20-41wks [phone]          Imaging Results              US OB Transvaginal (In process)                      Medications - No data to display  Medical Decision Making  Amount and/or Complexity of Data Reviewed  Labs: ordered.  Radiology: ordered.                          Medical Decision Making:   ED Management:  Urine with some blood, no UTI.  Transvaginal ultrasound shows about an 8 week fetus  with good movement, everything appeared intact.      Clinical Impression:   Final diagnoses:  [O20.9] Vaginal bleeding in pregnancy, first trimester (Primary)        ED Disposition Condition    Discharge Stable          ED Prescriptions    None       Follow-up Information       Follow up With Specialties Details Why Contact Info    Rishi Hilario MD Obstetrics and Gynecology Call  If symptoms worsen  [facility] [id number]  [phone]               Morteza Cadet MD  09/07/23 0042

## 2023-09-11 ENCOUNTER — INITIAL PRENATAL (OUTPATIENT)
Dept: OBSTETRICS AND GYNECOLOGY | Facility: CLINIC | Age: 18
End: 2023-09-11
Payer: MEDICAID

## 2023-09-11 VITALS — TEMPERATURE: 97 F | SYSTOLIC BLOOD PRESSURE: 106 MMHG | DIASTOLIC BLOOD PRESSURE: 64 MMHG

## 2023-09-11 DIAGNOSIS — Z34.91 INITIAL OBSTETRIC VISIT IN FIRST TRIMESTER: Primary | ICD-10-CM

## 2023-09-11 DIAGNOSIS — O20.0 THREATENED ABORTION IN FIRST TRIMESTER: ICD-10-CM

## 2023-09-11 DIAGNOSIS — Z3A.08 8 WEEKS GESTATION OF PREGNANCY: ICD-10-CM

## 2023-09-11 LAB
BILIRUB UR QL STRIP: NEGATIVE
GLUCOSE UR QL STRIP: NEGATIVE
KETONES UR QL STRIP: NEGATIVE
LEUKOCYTE ESTERASE UR QL STRIP: NEGATIVE
PH, POC UA: 5.5
POC BLOOD, URINE: POSITIVE
POC NITRATES, URINE: NEGATIVE
PROT UR QL STRIP: POSITIVE
SP GR UR STRIP: >=1.03 (ref 1–1.03)
UROBILINOGEN UR STRIP-ACNC: 1 (ref 0.1–1.1)

## 2023-09-11 PROCEDURE — 99204 PR OFFICE/OUTPT VISIT, NEW, LEVL IV, 45-59 MIN: ICD-10-PCS | Mod: TH,,, | Performed by: OBSTETRICS & GYNECOLOGY

## 2023-09-11 PROCEDURE — 87088 URINE BACTERIA CULTURE: CPT | Performed by: OBSTETRICS & GYNECOLOGY

## 2023-09-11 PROCEDURE — 99204 OFFICE O/P NEW MOD 45 MIN: CPT | Mod: TH,,, | Performed by: OBSTETRICS & GYNECOLOGY

## 2023-09-14 LAB — BACTERIA UR CULT: NORMAL

## 2023-09-28 ENCOUNTER — LAB VISIT (OUTPATIENT)
Dept: LAB | Facility: HOSPITAL | Age: 18
End: 2023-09-28
Attending: OBSTETRICS & GYNECOLOGY
Payer: MEDICAID

## 2023-09-28 DIAGNOSIS — Z34.91 INITIAL OBSTETRIC VISIT IN FIRST TRIMESTER: ICD-10-CM

## 2023-09-28 DIAGNOSIS — O20.0 THREATENED ABORTION IN FIRST TRIMESTER: ICD-10-CM

## 2023-09-28 DIAGNOSIS — Z3A.08 8 WEEKS GESTATION OF PREGNANCY: ICD-10-CM

## 2023-09-28 LAB
ABO AND RH: NORMAL
ANION GAP SERPL CALC-SCNC: 8 MEQ/L (ref 2–13)
ANTIBODY SCREEN: NORMAL
BASOPHILS # BLD AUTO: 0.03 X10(3)/MCL (ref 0.01–0.08)
BASOPHILS NFR BLD AUTO: 0.4 % (ref 0.1–1.2)
BUN SERPL-MCNC: 7 MG/DL (ref 7–20)
CALCIUM SERPL-MCNC: 9.7 MG/DL (ref 8.4–10.2)
CHLORIDE SERPL-SCNC: 104 MMOL/L (ref 98–110)
CO2 SERPL-SCNC: 23 MMOL/L (ref 21–32)
CREAT SERPL-MCNC: 0.55 MG/DL (ref 0.66–1.25)
CREAT/UREA NIT SERPL: 13 (ref 12–20)
EOSINOPHIL # BLD AUTO: 0.09 X10(3)/MCL (ref 0.04–0.36)
EOSINOPHIL NFR BLD AUTO: 1.2 % (ref 0.7–7)
ERYTHROCYTE [DISTWIDTH] IN BLOOD BY AUTOMATED COUNT: 11.7 % (ref 11–14.5)
GFR SERPLBLD CREATININE-BSD FMLA CKD-EPI: >90 MLS/MIN/1.73/M2
GLUCOSE SERPL-MCNC: 85 MG/DL (ref 70–115)
HBV SURFACE AG SERPL QL IA: NEGATIVE
HBV SURFACE AG SERPL QL IA: NORMAL
HCT VFR BLD AUTO: 38.1 % (ref 36–48)
HCV AB SERPL QL IA: NONREACTIVE
HGB BLD-MCNC: 13.3 G/DL (ref 11.8–16)
HIV 1+2 AB+HIV1 P24 AG SERPL QL IA: NONREACTIVE
IMM GRANULOCYTES # BLD AUTO: 0.03 X10(3)/MCL (ref 0–0.03)
IMM GRANULOCYTES NFR BLD AUTO: 0.4 % (ref 0–0.5)
LYMPHOCYTES # BLD AUTO: 2.02 X10(3)/MCL (ref 1.16–3.74)
LYMPHOCYTES NFR BLD AUTO: 26 % (ref 20–55)
MCH RBC QN AUTO: 30.7 PG (ref 27–34)
MCHC RBC AUTO-ENTMCNC: 34.9 G/DL (ref 31–37)
MCV RBC AUTO: 88 FL (ref 79–99)
MONOCYTES # BLD AUTO: 0.46 X10(3)/MCL (ref 0.24–0.36)
MONOCYTES NFR BLD AUTO: 5.9 % (ref 4.7–12.5)
NEUTROPHILS # BLD AUTO: 5.15 X10(3)/MCL (ref 1.56–6.13)
NEUTROPHILS NFR BLD AUTO: 66.1 % (ref 37–73)
NRBC BLD AUTO-RTO: 0 %
PLATELET # BLD AUTO: 240 X10(3)/MCL (ref 140–371)
PMV BLD AUTO: 9.6 FL (ref 9.4–12.4)
POTASSIUM SERPL-SCNC: 4.4 MMOL/L (ref 3.5–5.1)
RBC # BLD AUTO: 4.33 X10(6)/MCL (ref 4–5.1)
RUBELLA AB, IGG (OHS): 15.1 IU/ML
SODIUM SERPL-SCNC: 135 MMOL/L (ref 135–145)
SPECIMEN OUTDATE: NORMAL
WBC # SPEC AUTO: 7.78 X10(3)/MCL (ref 4–11.5)

## 2023-09-28 PROCEDURE — 80048 BASIC METABOLIC PNL TOTAL CA: CPT

## 2023-09-28 PROCEDURE — 36415 COLL VENOUS BLD VENIPUNCTURE: CPT

## 2023-09-28 PROCEDURE — 87340 HEPATITIS B SURFACE AG IA: CPT

## 2023-09-28 PROCEDURE — 87389 HIV-1 AG W/HIV-1&-2 AB AG IA: CPT

## 2023-09-28 PROCEDURE — 86803 HEPATITIS C AB TEST: CPT

## 2023-09-28 PROCEDURE — 86762 RUBELLA ANTIBODY: CPT

## 2023-09-28 PROCEDURE — 85025 COMPLETE CBC W/AUTO DIFF WBC: CPT

## 2023-09-28 PROCEDURE — 86850 RBC ANTIBODY SCREEN: CPT | Performed by: OBSTETRICS & GYNECOLOGY

## 2023-09-28 PROCEDURE — 86787 VARICELLA-ZOSTER ANTIBODY: CPT

## 2023-09-29 LAB
VZV IGG SER IA-ACNC: 0.7
VZV IGG SER QL IA: NEGATIVE

## 2023-09-29 NOTE — PROGRESS NOTES
HPI  18 y.o.  at 12w3d here for OB check.  C/O intermittent earache     ROS  Review of Systems   Constitutional:  Negative for chills and fever.   Gastrointestinal:  Negative for abdominal pain, constipation and diarrhea.   Genitourinary:  Negative for flank pain, genital sores, pelvic pain, urgency, vaginal bleeding, vaginal discharge, vaginal pain, postcoital bleeding and vaginal odor.         OBJECTIVE  /66 (BP Location: Right arm, Patient Position: Sitting, BP Method: Medium (Manual))   Temp 97.2 °F (36.2 °C) (Temporal)   Wt 52.6 kg (115 lb 15.4 oz)   LMP 2023 (Approximate)   BMI 22.65 kg/m²     Gen: No distress  Abdomen: Gravid, non-tender  Extremities: No edema  FHT: 145    ASSESSMENT  1. Susceptible to Varicella (non-immune), currently pregnant in first trimester    2. 12 weeks gestation of pregnancy  - POCT Urinalysis, Dipstick, Automated, W/O Scope    3. Ear ache        PLAN  Reviewed standard labor unit and kick count precautions.  Discussed pre-eclampsia precautions.  Discussed COVID-19 risks, social distancing, and isolation if respiratory symptoms develop.   PCP evaluation for earache, infection precautions    RTC 4 weeks

## 2023-10-09 ENCOUNTER — ROUTINE PRENATAL (OUTPATIENT)
Dept: OBSTETRICS AND GYNECOLOGY | Facility: CLINIC | Age: 18
End: 2023-10-09
Payer: MEDICAID

## 2023-10-09 VITALS
WEIGHT: 115.94 LBS | SYSTOLIC BLOOD PRESSURE: 106 MMHG | DIASTOLIC BLOOD PRESSURE: 66 MMHG | BODY MASS INDEX: 22.65 KG/M2 | TEMPERATURE: 97 F

## 2023-10-09 DIAGNOSIS — Z3A.12 12 WEEKS GESTATION OF PREGNANCY: ICD-10-CM

## 2023-10-09 DIAGNOSIS — Z28.39 SUSCEPTIBLE TO VARICELLA (NON-IMMUNE), CURRENTLY PREGNANT IN FIRST TRIMESTER: Primary | ICD-10-CM

## 2023-10-09 DIAGNOSIS — H92.09 EAR ACHE: ICD-10-CM

## 2023-10-09 DIAGNOSIS — O09.891 SUSCEPTIBLE TO VARICELLA (NON-IMMUNE), CURRENTLY PREGNANT IN FIRST TRIMESTER: Primary | ICD-10-CM

## 2023-10-09 PROBLEM — Z34.91 INITIAL OBSTETRIC VISIT IN FIRST TRIMESTER: Status: RESOLVED | Noted: 2023-09-11 | Resolved: 2023-10-09

## 2023-10-09 LAB
BILIRUB UR QL STRIP: NORMAL
GLUCOSE UR QL STRIP: NEGATIVE
KETONES UR QL STRIP: NORMAL
LEUKOCYTE ESTERASE UR QL STRIP: NEGATIVE
PH, POC UA: NORMAL
POC BLOOD, URINE: NORMAL
POC NITRATES, URINE: NEGATIVE
PROT UR QL STRIP: NEGATIVE
SP GR UR STRIP: NORMAL (ref 1–1.03)
UROBILINOGEN UR STRIP-ACNC: NORMAL (ref 0.3–2.2)

## 2023-10-09 PROCEDURE — 99213 PR OFFICE/OUTPT VISIT, EST, LEVL III, 20-29 MIN: ICD-10-PCS | Mod: TH,,, | Performed by: OBSTETRICS & GYNECOLOGY

## 2023-10-09 PROCEDURE — 99213 OFFICE O/P EST LOW 20 MIN: CPT | Mod: TH,,, | Performed by: OBSTETRICS & GYNECOLOGY

## 2023-11-03 ENCOUNTER — PATIENT MESSAGE (OUTPATIENT)
Dept: OTHER | Facility: OTHER | Age: 18
End: 2023-11-03
Payer: MEDICAID

## 2023-11-06 ENCOUNTER — ROUTINE PRENATAL (OUTPATIENT)
Dept: OBSTETRICS AND GYNECOLOGY | Facility: CLINIC | Age: 18
End: 2023-11-06
Payer: MEDICAID

## 2023-11-06 VITALS — BODY MASS INDEX: 22.85 KG/M2 | WEIGHT: 117 LBS | SYSTOLIC BLOOD PRESSURE: 106 MMHG | DIASTOLIC BLOOD PRESSURE: 64 MMHG

## 2023-11-06 DIAGNOSIS — Z3A.16 16 WEEKS GESTATION OF PREGNANCY: ICD-10-CM

## 2023-11-06 DIAGNOSIS — O09.892 SUSCEPTIBLE TO VARICELLA (NON-IMMUNE), CURRENTLY PREGNANT IN SECOND TRIMESTER: Primary | ICD-10-CM

## 2023-11-06 DIAGNOSIS — Z28.39 SUSCEPTIBLE TO VARICELLA (NON-IMMUNE), CURRENTLY PREGNANT IN SECOND TRIMESTER: Primary | ICD-10-CM

## 2023-11-06 DIAGNOSIS — O21.9 NAUSEA AND VOMITING OF PREGNANCY, ANTEPARTUM: ICD-10-CM

## 2023-11-06 PROCEDURE — 99213 OFFICE O/P EST LOW 20 MIN: CPT | Mod: TH,,, | Performed by: OBSTETRICS & GYNECOLOGY

## 2023-11-06 PROCEDURE — 99213 PR OFFICE/OUTPT VISIT, EST, LEVL III, 20-29 MIN: ICD-10-PCS | Mod: TH,,, | Performed by: OBSTETRICS & GYNECOLOGY

## 2023-11-06 NOTE — PROGRESS NOTES
HPI  18 y.o.  at 16w3d here for OB check.  Nausea and vomiting resolved 6 days ago.  Doing well now.       ROS  Review of Systems   Constitutional:  Negative for chills and fever.   Gastrointestinal:  Negative for abdominal pain, constipation and diarrhea.   Genitourinary:  Negative for flank pain, genital sores, pelvic pain, urgency, vaginal bleeding, vaginal discharge, vaginal pain, postcoital bleeding and vaginal odor.         OBJECTIVE  /64   Wt 53.1 kg (117 lb)   LMP 2023 (Approximate)   BMI 22.85 kg/m²     Gen: No distress  Abdomen: Gravid, non-tender  Extremities: No edema  FHT: 140s    ASSESSMENT  1. Susceptible to Varicella (non-immune), currently pregnant in second trimester    2. 16 weeks gestation of pregnancy  - POCT Urinalysis, Dipstick, Automated, W/O Scope  - US OB 14+ Wks, TransAbd, Single Gestation; Future    3. Nausea and vomiting of pregnancy, antepartum        PLAN  Reviewed standard labor unit and kick count precautions.  Discussed pre-eclampsia precautions.  Discussed COVID-19 risks, social distancing, and isolation if respiratory symptoms develop.   Anatomy scan   Desires CfDNA, MSAFP order   RTC 4 weeks ob check

## 2023-11-07 ENCOUNTER — APPOINTMENT (OUTPATIENT)
Dept: LAB | Facility: HOSPITAL | Age: 18
End: 2023-11-07
Attending: OBSTETRICS & GYNECOLOGY
Payer: MEDICAID

## 2023-11-08 LAB — MAYO GENERIC ORDERABLE RESULT: NORMAL

## 2023-12-01 ENCOUNTER — PATIENT MESSAGE (OUTPATIENT)
Dept: OTHER | Facility: OTHER | Age: 18
End: 2023-12-01
Payer: MEDICAID

## 2023-12-04 ENCOUNTER — ROUTINE PRENATAL (OUTPATIENT)
Dept: OBSTETRICS AND GYNECOLOGY | Facility: CLINIC | Age: 18
End: 2023-12-04
Payer: MEDICAID

## 2023-12-04 VITALS — SYSTOLIC BLOOD PRESSURE: 110 MMHG | WEIGHT: 121.5 LBS | BODY MASS INDEX: 23.72 KG/M2 | DIASTOLIC BLOOD PRESSURE: 68 MMHG

## 2023-12-04 DIAGNOSIS — Z3A.20 20 WEEKS GESTATION OF PREGNANCY: ICD-10-CM

## 2023-12-04 DIAGNOSIS — O26.899 PREGNANCY RELATED NAUSEA, ANTEPARTUM: Primary | ICD-10-CM

## 2023-12-04 DIAGNOSIS — R11.0 PREGNANCY RELATED NAUSEA, ANTEPARTUM: Primary | ICD-10-CM

## 2023-12-04 PROCEDURE — 99213 OFFICE O/P EST LOW 20 MIN: CPT | Mod: TH,,, | Performed by: OBSTETRICS & GYNECOLOGY

## 2023-12-04 PROCEDURE — 99213 PR OFFICE/OUTPT VISIT, EST, LEVL III, 20-29 MIN: ICD-10-PCS | Mod: TH,,, | Performed by: OBSTETRICS & GYNECOLOGY

## 2023-12-04 NOTE — PROGRESS NOTES
HPI  18 y.o.  at 20w3d here for OB check.  C/o nausea.       ROS  Review of Systems   Constitutional:  Negative for chills and fever.   Gastrointestinal:  Positive for nausea. Negative for abdominal pain, constipation and diarrhea.   Genitourinary:  Negative for flank pain, genital sores, pelvic pain, urgency, vaginal bleeding, vaginal discharge, vaginal pain, postcoital bleeding and vaginal odor.         OBJECTIVE  /68   Wt 55.1 kg (121 lb 7.6 oz)   LMP 2023 (Approximate)   BMI 23.72 kg/m²     Gen: No distress  Abdomen: Gravid, non-tender  Extremities: No edema  FHT: 150  FH: 20cm    ASSESSMENT  1. Pregnancy related nausea, antepartum    2. 20 weeks gestation of pregnancy  - POCT Urinalysis, Dipstick, Automated, W/O Scope        PLAN  Reviewed standard labor unit and kick count precautions.  Discussed pre-eclampsia precautions.  Discussed COVID-19 risks, social distancing, and isolation if respiratory symptoms develop.   CfDNA negative   Anatomy scan scheduled   Unisom, B6 for nausea. PO hydration. Precautions.     RTC 4 weeks

## 2023-12-05 ENCOUNTER — HOSPITAL ENCOUNTER (OUTPATIENT)
Dept: RADIOLOGY | Facility: HOSPITAL | Age: 18
Discharge: HOME | End: 2023-12-05
Attending: OBSTETRICS & GYNECOLOGY
Payer: MEDICAID

## 2023-12-05 DIAGNOSIS — Z3A.16 16 WEEKS GESTATION OF PREGNANCY: ICD-10-CM

## 2023-12-05 PROCEDURE — 76805 OB US >/= 14 WKS SNGL FETUS: CPT | Mod: TC

## 2023-12-27 NOTE — PROGRESS NOTES
HPI  18 y.o.  at 24w4d here for OB check.  Reports positive fetal movement. denies LOF, CTX.       ROS  Review of Systems   Constitutional:  Negative for chills and fever.   Gastrointestinal:  Negative for abdominal pain, constipation and diarrhea.   Genitourinary:  Negative for flank pain, genital sores, pelvic pain, urgency, vaginal bleeding, vaginal discharge, vaginal pain, postcoital bleeding and vaginal odor.         OBJECTIVE  /62   Wt 57.8 kg (127 lb 6.4 oz)   LMP 2023 (Approximate)   BMI 24.88 kg/m²     Gen: No distress  Abdomen: Gravid, non-tender  Extremities: No edema  FHT: 140s  FH: 24cm    ASSESSMENT  1. Susceptible to Varicella (non-immune), currently pregnant in second trimester  - CBC Auto Differential; Future  - GTT 1 Hour; Future  - SYPHILIS ANTIBODY (WITH REFLEX RPR); Future    2. 24 weeks gestation of pregnancy  - POCT Urinalysis, Dipstick, Automated, W/O Scope  - CBC Auto Differential; Future  - GTT 1 Hour; Future  - SYPHILIS ANTIBODY (WITH REFLEX RPR); Future        PLAN  Reviewed standard labor unit and kick count precautions.  Discussed pre-eclampsia precautions.  Discussed COVID-19 risks, social distancing, and isolation if respiratory symptoms develop.       RTC 4 weeks

## 2023-12-29 ENCOUNTER — PATIENT MESSAGE (OUTPATIENT)
Dept: OTHER | Facility: OTHER | Age: 18
End: 2023-12-29
Payer: MEDICAID

## 2024-01-02 ENCOUNTER — ROUTINE PRENATAL (OUTPATIENT)
Dept: OBSTETRICS AND GYNECOLOGY | Facility: CLINIC | Age: 19
End: 2024-01-02
Payer: MEDICAID

## 2024-01-02 VITALS
WEIGHT: 127.38 LBS | DIASTOLIC BLOOD PRESSURE: 62 MMHG | BODY MASS INDEX: 24.88 KG/M2 | SYSTOLIC BLOOD PRESSURE: 104 MMHG

## 2024-01-02 DIAGNOSIS — Z3A.24 24 WEEKS GESTATION OF PREGNANCY: ICD-10-CM

## 2024-01-02 DIAGNOSIS — Z28.39 SUSCEPTIBLE TO VARICELLA (NON-IMMUNE), CURRENTLY PREGNANT IN SECOND TRIMESTER: Primary | ICD-10-CM

## 2024-01-02 DIAGNOSIS — O09.892 SUSCEPTIBLE TO VARICELLA (NON-IMMUNE), CURRENTLY PREGNANT IN SECOND TRIMESTER: Primary | ICD-10-CM

## 2024-01-02 PROCEDURE — 99213 OFFICE O/P EST LOW 20 MIN: CPT | Mod: TH,,, | Performed by: OBSTETRICS & GYNECOLOGY

## 2024-01-12 ENCOUNTER — PATIENT MESSAGE (OUTPATIENT)
Dept: OTHER | Facility: OTHER | Age: 19
End: 2024-01-12
Payer: MEDICAID

## 2024-01-18 ENCOUNTER — LAB VISIT (OUTPATIENT)
Dept: LAB | Facility: HOSPITAL | Age: 19
End: 2024-01-18
Attending: OBSTETRICS & GYNECOLOGY
Payer: MEDICAID

## 2024-01-18 DIAGNOSIS — Z28.39 SUSCEPTIBLE TO VARICELLA (NON-IMMUNE), CURRENTLY PREGNANT IN SECOND TRIMESTER: ICD-10-CM

## 2024-01-18 DIAGNOSIS — Z3A.24 24 WEEKS GESTATION OF PREGNANCY: ICD-10-CM

## 2024-01-18 DIAGNOSIS — O09.892 SUSCEPTIBLE TO VARICELLA (NON-IMMUNE), CURRENTLY PREGNANT IN SECOND TRIMESTER: ICD-10-CM

## 2024-01-18 LAB — T PALLIDUM AB SER QL: NONREACTIVE

## 2024-01-18 PROCEDURE — 36415 COLL VENOUS BLD VENIPUNCTURE: CPT

## 2024-01-18 PROCEDURE — 82950 GLUCOSE TEST: CPT

## 2024-01-18 PROCEDURE — 86780 TREPONEMA PALLIDUM: CPT

## 2024-01-18 PROCEDURE — 85025 COMPLETE CBC W/AUTO DIFF WBC: CPT

## 2024-01-19 LAB
BASOPHILS # BLD AUTO: 0.01 X10(3)/MCL (ref 0.01–0.08)
BASOPHILS NFR BLD AUTO: 0.2 % (ref 0.1–1.2)
EOSINOPHIL # BLD AUTO: 0.04 X10(3)/MCL (ref 0.04–0.36)
EOSINOPHIL NFR BLD AUTO: 0.9 % (ref 0.7–7)
ERYTHROCYTE [DISTWIDTH] IN BLOOD BY AUTOMATED COUNT: 12.9 % (ref 11–14.5)
GLUCOSE 1H P 100 G GLC PO SERPL-MCNC: 140 MG/DL
HCT VFR BLD AUTO: 37 % (ref 36–48)
HGB BLD-MCNC: 12.6 G/DL (ref 11.8–16)
IMM GRANULOCYTES # BLD AUTO: 0.03 X10(3)/MCL (ref 0–0.03)
IMM GRANULOCYTES NFR BLD AUTO: 0.7 % (ref 0–0.5)
LYMPHOCYTES # BLD AUTO: 1.47 X10(3)/MCL (ref 1.16–3.74)
LYMPHOCYTES NFR BLD AUTO: 32.9 % (ref 20–55)
MCH RBC QN AUTO: 31 PG (ref 27–34)
MCHC RBC AUTO-ENTMCNC: 34.1 G/DL (ref 31–37)
MCV RBC AUTO: 91.1 FL (ref 79–99)
MONOCYTES # BLD AUTO: 0.34 X10(3)/MCL (ref 0.24–0.36)
MONOCYTES NFR BLD AUTO: 7.6 % (ref 4.7–12.5)
NEUTROPHILS # BLD AUTO: 2.58 X10(3)/MCL (ref 1.56–6.13)
NEUTROPHILS NFR BLD AUTO: 57.7 % (ref 37–73)
PLATELET # BLD AUTO: 196 X10(3)/MCL (ref 140–371)
PMV BLD AUTO: 10.4 FL (ref 9.4–12.4)
RBC # BLD AUTO: 4.06 X10(6)/MCL (ref 4–5.1)
WBC # SPEC AUTO: 4.47 X10(3)/MCL (ref 4–11.5)

## 2024-01-24 NOTE — PROGRESS NOTES
HPI  18 y.o.  at 28w3d here for OB check.  Reports positive fetal movement. denies LOF, CTX.     24:  O'Cueva: 140  H&H: 12.6/37.0  RPR: nonreactive     ROS  Review of Systems   Constitutional:  Negative for chills and fever.   Gastrointestinal:  Negative for abdominal pain, constipation and diarrhea.   Genitourinary:  Negative for flank pain, genital sores, pelvic pain, urgency, vaginal bleeding, vaginal discharge, vaginal pain, postcoital bleeding and vaginal odor.         OBJECTIVE  /64 (BP Location: Right arm, Patient Position: Sitting, BP Method: Medium (Manual))   Temp 97.2 °F (36.2 °C) (Temporal)   Wt 57.4 kg (126 lb 8.7 oz)   LMP 2023 (Approximate)   BMI 24.71 kg/m²     Gen: No distress  Abdomen: Gravid, non-tender  Extremities: No edema    FHT: 135  FH: 28cm    ASSESSMENT  1. Susceptible to Varicella (non-immune), currently pregnant in third trimester    2. 28 weeks gestation of pregnancy    3. Abnormal O'Cueva glucose challenge test, antepartum  - Glucose Tolerance, 3 Hours OB; Future        PLAN  Reviewed standard labor unit and kick count precautions.  Discussed pre-eclampsia precautions.  Discussed COVID-19 risks, social distancing, and isolation if respiratory symptoms develop.     Anatomy scan WNL     3 hour GTT     RTC 2 weeks

## 2024-01-26 ENCOUNTER — PATIENT MESSAGE (OUTPATIENT)
Dept: OTHER | Facility: OTHER | Age: 19
End: 2024-01-26
Payer: MEDICAID

## 2024-01-29 ENCOUNTER — ROUTINE PRENATAL (OUTPATIENT)
Dept: OBSTETRICS AND GYNECOLOGY | Facility: CLINIC | Age: 19
End: 2024-01-29
Payer: MEDICAID

## 2024-01-29 VITALS
DIASTOLIC BLOOD PRESSURE: 64 MMHG | TEMPERATURE: 97 F | WEIGHT: 126.56 LBS | BODY MASS INDEX: 24.71 KG/M2 | SYSTOLIC BLOOD PRESSURE: 102 MMHG

## 2024-01-29 DIAGNOSIS — Z28.39 SUSCEPTIBLE TO VARICELLA (NON-IMMUNE), CURRENTLY PREGNANT IN THIRD TRIMESTER: Primary | ICD-10-CM

## 2024-01-29 DIAGNOSIS — Z3A.28 28 WEEKS GESTATION OF PREGNANCY: ICD-10-CM

## 2024-01-29 DIAGNOSIS — O99.810 ABNORMAL O'SULLIVAN GLUCOSE CHALLENGE TEST, ANTEPARTUM: ICD-10-CM

## 2024-01-29 DIAGNOSIS — O09.893 SUSCEPTIBLE TO VARICELLA (NON-IMMUNE), CURRENTLY PREGNANT IN THIRD TRIMESTER: Primary | ICD-10-CM

## 2024-01-29 PROBLEM — O21.9 NAUSEA AND VOMITING OF PREGNANCY, ANTEPARTUM: Status: RESOLVED | Noted: 2023-11-06 | Resolved: 2024-01-29

## 2024-01-29 PROBLEM — O20.0 THREATENED ABORTION IN FIRST TRIMESTER: Status: RESOLVED | Noted: 2023-09-11 | Resolved: 2024-01-29

## 2024-01-29 PROCEDURE — 99213 OFFICE O/P EST LOW 20 MIN: CPT | Mod: TH,,, | Performed by: OBSTETRICS & GYNECOLOGY

## 2024-01-30 ENCOUNTER — PATIENT MESSAGE (OUTPATIENT)
Dept: OBSTETRICS AND GYNECOLOGY | Facility: CLINIC | Age: 19
End: 2024-01-30
Payer: MEDICAID

## 2024-01-31 ENCOUNTER — TELEPHONE (OUTPATIENT)
Dept: OBSTETRICS AND GYNECOLOGY | Facility: CLINIC | Age: 19
End: 2024-01-31
Payer: MEDICAID

## 2024-01-31 ENCOUNTER — LAB VISIT (OUTPATIENT)
Dept: LAB | Facility: HOSPITAL | Age: 19
End: 2024-01-31
Attending: OBSTETRICS & GYNECOLOGY
Payer: MEDICAID

## 2024-01-31 DIAGNOSIS — O99.810 ABNORMAL O'SULLIVAN GLUCOSE CHALLENGE TEST, ANTEPARTUM: ICD-10-CM

## 2024-01-31 LAB
GLUCOSE 1H P 100 G GLC PO SERPL-MCNC: 189 MG/DL
GLUCOSE 2H P 100 G GLC PO SERPL-MCNC: 168 MG/DL
GLUCOSE 3H P 100 G GLC PO SERPL-MCNC: 167 MG/DL
GLUCOSE P FAST SERPL-MCNC: 67 MG/DL (ref 70–115)
POCT GLUCOSE: 56 MG/DL (ref 70–110)

## 2024-01-31 PROCEDURE — 82951 GLUCOSE TOLERANCE TEST (GTT): CPT

## 2024-01-31 PROCEDURE — 82952 GTT-ADDED SAMPLES: CPT

## 2024-01-31 PROCEDURE — 36415 COLL VENOUS BLD VENIPUNCTURE: CPT

## 2024-01-31 PROCEDURE — 82947 ASSAY GLUCOSE BLOOD QUANT: CPT

## 2024-01-31 PROCEDURE — 82950 GLUCOSE TEST: CPT

## 2024-01-31 NOTE — TELEPHONE ENCOUNTER
Pt needs to come into clinic tomorrow for discussion.   Attempted to contact pt x2- call rejected.

## 2024-01-31 NOTE — TELEPHONE ENCOUNTER
Lab called stating pt there to do 3 hour GTT but has O'Cueva result of 140. Informed lab Dr Blackwell put order in for 3 hour GTT and informed pt to do so. Voiced understanding.

## 2024-01-31 NOTE — TELEPHONE ENCOUNTER
----- Message from Rishi Hilario MD sent at 1/31/2024  1:34 PM CST -----  Please have her RTC Tomorrow for discussion

## 2024-02-01 ENCOUNTER — ROUTINE PRENATAL (OUTPATIENT)
Dept: OBSTETRICS AND GYNECOLOGY | Facility: CLINIC | Age: 19
End: 2024-02-01
Payer: MEDICAID

## 2024-02-01 VITALS — BODY MASS INDEX: 24.37 KG/M2 | DIASTOLIC BLOOD PRESSURE: 68 MMHG | WEIGHT: 124.75 LBS | SYSTOLIC BLOOD PRESSURE: 96 MMHG

## 2024-02-01 DIAGNOSIS — O09.893 SUSCEPTIBLE TO VARICELLA (NON-IMMUNE), CURRENTLY PREGNANT IN THIRD TRIMESTER: ICD-10-CM

## 2024-02-01 DIAGNOSIS — Z3A.28 28 WEEKS GESTATION OF PREGNANCY: ICD-10-CM

## 2024-02-01 DIAGNOSIS — O24.415 GESTATIONAL DIABETES MELLITUS (GDM) IN THIRD TRIMESTER CONTROLLED ON ORAL HYPOGLYCEMIC DRUG: Primary | ICD-10-CM

## 2024-02-01 DIAGNOSIS — Z28.39 SUSCEPTIBLE TO VARICELLA (NON-IMMUNE), CURRENTLY PREGNANT IN THIRD TRIMESTER: ICD-10-CM

## 2024-02-01 PROCEDURE — 99214 OFFICE O/P EST MOD 30 MIN: CPT | Mod: TH,,, | Performed by: OBSTETRICS & GYNECOLOGY

## 2024-02-01 RX ORDER — INSULIN PUMP SYRINGE, 3 ML
EACH MISCELLANEOUS
Qty: 1 EACH | Status: SHIPPED | OUTPATIENT
Start: 2024-02-01 | End: 2024-02-02 | Stop reason: SDUPTHER

## 2024-02-01 NOTE — PROGRESS NOTES
HPI  18 y.o.  at 28w6d here to follow up 3 hr GTT.  Doing well.     Fastin  1 Hr: 189  2 Hr: 168  3 Hr: 167      ROS  Review of Systems   Constitutional:  Negative for chills and fever.   Gastrointestinal:  Negative for abdominal pain, constipation and diarrhea.   Genitourinary:  Negative for flank pain, genital sores, pelvic pain, urgency, vaginal bleeding, vaginal discharge, vaginal pain, postcoital bleeding and vaginal odor.         OBJECTIVE  BP 96/68   Wt 56.6 kg (124 lb 12.5 oz)   LMP 2023 (Approximate)   BMI 24.37 kg/m²     Gen: No distress    FHT: 140    ASSESSMENT  1. Gestational diabetes mellitus (GDM) in third trimester controlled on oral hypoglycemic drug    2. 28 weeks gestation of pregnancy    3. Susceptible to Varicella (non-immune), currently pregnant in third trimester        PLAN  Labor unit, kick count, and pre-eclampsia precautions given  Discussed 3 HR GTT.  2 and 3 hr levels abnormal and 1 hr was borderline.  C/w GDM.  Discussed gestational diabetes mellitus, associated risks, and need for strict glycemic controlt o minimize risks to fetus.  Will have her check blood sugars 4x daily, fasting and 2 hr after each meal.  Log supplied  Discussed 2200Kcal ADA diet with 35-40% carbs, but all complex carbohydrates.  Will do growth US on RTC  RTC in 1 week to f/u labs.

## 2024-02-02 ENCOUNTER — TELEPHONE (OUTPATIENT)
Dept: OBSTETRICS AND GYNECOLOGY | Facility: CLINIC | Age: 19
End: 2024-02-02
Payer: MEDICAID

## 2024-02-02 DIAGNOSIS — O24.415 GESTATIONAL DIABETES MELLITUS (GDM) IN THIRD TRIMESTER CONTROLLED ON ORAL HYPOGLYCEMIC DRUG: Primary | ICD-10-CM

## 2024-02-02 RX ORDER — LANCETS
1 EACH MISCELLANEOUS 4 TIMES DAILY
Qty: 200 EACH | Refills: 1 | Status: ON HOLD | OUTPATIENT
Start: 2024-02-02 | End: 2024-04-21

## 2024-02-02 RX ORDER — INSULIN PUMP SYRINGE, 3 ML
EACH MISCELLANEOUS
Qty: 1 EACH | Refills: 0 | Status: ON HOLD | OUTPATIENT
Start: 2024-02-02 | End: 2024-04-21

## 2024-02-02 NOTE — TELEPHONE ENCOUNTER
----- Message from Hiwot Odell sent at 2/2/2024  7:52 AM CST -----  Regarding: Pharmacy question  .Type:  Pharmacy Calling to Clarify an RX    Name of Caller: Peg  Pharmacy Name: [facility]  What do they need to clarify?: needs to clarify script sent in  Best Call Back Number: [phone]  Additional Information:  left message with the answering service regarding script

## 2024-02-07 ENCOUNTER — ROUTINE PRENATAL (OUTPATIENT)
Dept: OBSTETRICS AND GYNECOLOGY | Facility: CLINIC | Age: 19
End: 2024-02-07
Payer: MEDICAID

## 2024-02-07 VITALS
DIASTOLIC BLOOD PRESSURE: 68 MMHG | WEIGHT: 123.81 LBS | SYSTOLIC BLOOD PRESSURE: 114 MMHG | BODY MASS INDEX: 24.18 KG/M2

## 2024-02-07 DIAGNOSIS — O09.893 SUSCEPTIBLE TO VARICELLA (NON-IMMUNE), CURRENTLY PREGNANT IN THIRD TRIMESTER: ICD-10-CM

## 2024-02-07 DIAGNOSIS — O99.810 ABNORMAL O'SULLIVAN GLUCOSE CHALLENGE TEST, ANTEPARTUM: ICD-10-CM

## 2024-02-07 DIAGNOSIS — Z28.39 SUSCEPTIBLE TO VARICELLA (NON-IMMUNE), CURRENTLY PREGNANT IN THIRD TRIMESTER: ICD-10-CM

## 2024-02-07 DIAGNOSIS — O24.415 GESTATIONAL DIABETES MELLITUS (GDM) IN THIRD TRIMESTER CONTROLLED ON ORAL HYPOGLYCEMIC DRUG: Primary | ICD-10-CM

## 2024-02-07 DIAGNOSIS — Z3A.29 29 WEEKS GESTATION OF PREGNANCY: ICD-10-CM

## 2024-02-07 PROCEDURE — 76816 OB US FOLLOW-UP PER FETUS: CPT | Mod: ,,, | Performed by: OBSTETRICS & GYNECOLOGY

## 2024-02-07 PROCEDURE — 99214 OFFICE O/P EST MOD 30 MIN: CPT | Mod: TH,,, | Performed by: OBSTETRICS & GYNECOLOGY

## 2024-02-09 ENCOUNTER — PATIENT MESSAGE (OUTPATIENT)
Dept: OTHER | Facility: OTHER | Age: 19
End: 2024-02-09
Payer: MEDICAID

## 2024-02-23 ENCOUNTER — PATIENT MESSAGE (OUTPATIENT)
Dept: OTHER | Facility: OTHER | Age: 19
End: 2024-02-23
Payer: MEDICAID

## 2024-02-26 ENCOUNTER — ROUTINE PRENATAL (OUTPATIENT)
Dept: OBSTETRICS AND GYNECOLOGY | Facility: CLINIC | Age: 19
End: 2024-02-26
Payer: MEDICAID

## 2024-02-26 VITALS
SYSTOLIC BLOOD PRESSURE: 110 MMHG | DIASTOLIC BLOOD PRESSURE: 60 MMHG | WEIGHT: 128 LBS | HEIGHT: 61 IN | TEMPERATURE: 98 F | BODY MASS INDEX: 24.17 KG/M2

## 2024-02-26 DIAGNOSIS — Z28.39 SUSCEPTIBLE TO VARICELLA (NON-IMMUNE), CURRENTLY PREGNANT IN THIRD TRIMESTER: ICD-10-CM

## 2024-02-26 DIAGNOSIS — Z3A.32 32 WEEKS GESTATION OF PREGNANCY: ICD-10-CM

## 2024-02-26 DIAGNOSIS — O24.410 DIET CONTROLLED GESTATIONAL DIABETES MELLITUS (GDM) IN THIRD TRIMESTER: Primary | ICD-10-CM

## 2024-02-26 DIAGNOSIS — O09.893 SUSCEPTIBLE TO VARICELLA (NON-IMMUNE), CURRENTLY PREGNANT IN THIRD TRIMESTER: ICD-10-CM

## 2024-02-26 PROBLEM — O99.810 ABNORMAL O'SULLIVAN GLUCOSE CHALLENGE TEST, ANTEPARTUM: Status: RESOLVED | Noted: 2024-01-29 | Resolved: 2024-02-26

## 2024-02-26 PROCEDURE — 99214 OFFICE O/P EST MOD 30 MIN: CPT | Mod: TH,,, | Performed by: OBSTETRICS & GYNECOLOGY

## 2024-02-26 NOTE — PROGRESS NOTES
"HPI  18 y.o.  at 32w3d here for OB check.  Doing well.   Reports positive fetal movement. denies LOF, CTX.       DXT LOG:  Fastin-84  2hr PP breakfast:   2hr PP lunch:   2hr PP dinner:         ROS  Review of Systems   Constitutional:  Negative for chills and fever.   Gastrointestinal:  Negative for abdominal pain, constipation and diarrhea.   Genitourinary:  Negative for flank pain, genital sores, pelvic pain, urgency, vaginal bleeding, vaginal discharge, vaginal pain, postcoital bleeding and vaginal odor.         OBJECTIVE  /60 (BP Location: Right arm, Patient Position: Sitting)   Temp 98.2 °F (36.8 °C)   Ht 5' 1" (1.549 m)   Wt 58.1 kg (128 lb)   LMP 2023 (Approximate)   BMI 24.19 kg/m²     Gen: No distress  Abdomen: Gravid, non-tender  Extremities: No edema  FHT: 130  FH: 31 cm      ASSESSMENT  1. Diet controlled gestational diabetes mellitus (GDM) in third trimester    2. 32 weeks gestation of pregnancy  - POCT Urinalysis, Dipstick, Automated, W/O Scope    3. Susceptible to Varicella (non-immune), currently pregnant in third trimester        PLAN  Reviewed standard labor unit and kick count precautions.  Discussed pre-eclampsia precautions.  Discussed COVID-19 risks, social distancing, and isolation if respiratory symptoms develop.     DXT log reviewed. Doing well. Will decrease sugar checks- BID alternating lunch, dinner checks.       RTC 2 weeks       "

## 2024-03-15 ENCOUNTER — PATIENT MESSAGE (OUTPATIENT)
Dept: OTHER | Facility: OTHER | Age: 19
End: 2024-03-15
Payer: MEDICAID

## 2024-03-18 ENCOUNTER — ROUTINE PRENATAL (OUTPATIENT)
Dept: OBSTETRICS AND GYNECOLOGY | Facility: CLINIC | Age: 19
End: 2024-03-18
Payer: MEDICAID

## 2024-03-18 VITALS
BODY MASS INDEX: 25.13 KG/M2 | TEMPERATURE: 97 F | DIASTOLIC BLOOD PRESSURE: 64 MMHG | WEIGHT: 133 LBS | SYSTOLIC BLOOD PRESSURE: 104 MMHG

## 2024-03-18 DIAGNOSIS — O24.410 DIET CONTROLLED GESTATIONAL DIABETES MELLITUS (GDM) IN THIRD TRIMESTER: Primary | ICD-10-CM

## 2024-03-18 DIAGNOSIS — Z28.39 SUSCEPTIBLE TO VARICELLA (NON-IMMUNE), CURRENTLY PREGNANT IN THIRD TRIMESTER: ICD-10-CM

## 2024-03-18 DIAGNOSIS — O09.893 SUSCEPTIBLE TO VARICELLA (NON-IMMUNE), CURRENTLY PREGNANT IN THIRD TRIMESTER: ICD-10-CM

## 2024-03-18 DIAGNOSIS — Z3A.35 35 WEEKS GESTATION OF PREGNANCY: ICD-10-CM

## 2024-03-18 PROCEDURE — 99214 OFFICE O/P EST MOD 30 MIN: CPT | Mod: TH,,, | Performed by: OBSTETRICS & GYNECOLOGY

## 2024-03-18 NOTE — PROGRESS NOTES
HPI  18 y.o.  at 35w3d here for OB check.  Reports positive fetal movement. denies LOF, CTX.       DXT LOG:  Fastin-86  1hr PP breakfast:82-93, 135  1hr PP lunch:   1hr PP dinner:           ROS  Review of Systems   Constitutional:  Negative for chills and fever.   Gastrointestinal:  Negative for abdominal pain, constipation and diarrhea.   Genitourinary:  Negative for flank pain, genital sores, pelvic pain, urgency, vaginal bleeding, vaginal discharge, vaginal pain, postcoital bleeding and vaginal odor.         OBJECTIVE  /64 (BP Location: Right arm, Patient Position: Sitting, BP Method: Medium (Manual))   Temp 97 °F (36.1 °C) (Temporal)   Wt 60.3 kg (133 lb)   LMP 2023 (Approximate)   BMI 25.13 kg/m²     Gen: No distress  Abdomen: Gravid, non-tender  Extremities: No edema  FHT: 140s  FH: 34    ASSESSMENT  1. Diet controlled gestational diabetes mellitus (GDM) in third trimester    2. 35 weeks gestation of pregnancy    3. Susceptible to Varicella (non-immune), currently pregnant in third trimester        PLAN  Reviewed standard labor unit and kick count precautions.  Discussed pre-eclampsia precautions.  Discussed COVID-19 risks, social distancing, and isolation if respiratory symptoms develop.     Growth scan on RTC  cont DXT BID alternating lunch, dinner checks     RTC 1 week pre admit , growth scan     This note was transcribed by Stephani Claros. There may be transcription errors as a result, however minimal. Effort has been made to ensure accuracy of transcription, but any obvious errors or omissions should be clarified with the author of the document.

## 2024-03-20 NOTE — PROGRESS NOTES
History & Physical    SUBJECTIVE:     History of Present Illness:  Patient is a 18 y.o. female  at 36w3d TAHIRA 24 here today for ob pre admit, growth scan. positive fetal movement, denies CTX, LOF.     DXT Log: nml      Chief Complaint   Patient presents with    Routine Prenatal Visit     36w3d OB here for pre-admit and routine OB check.    Follow-up Growth Scan       Review of patient's allergies indicates:  No Known Allergies    Current Outpatient Medications   Medication Sig Dispense Refill    blood sugar diagnostic Strp 1 strip by Misc.(Non-Drug; Combo Route) route 4 (four) times daily. 200 strip 1    blood-glucose meter kit Use as instructed 1 each 0    lancets (ACCU-CHEK SOFTCLIX LANCETS) Misc 1 Lancet by Misc.(Non-Drug; Combo Route) route 4 (four) times daily. 200 each 1    prenatal no115/iron/folic acid (PRENATAL 19 ORAL) Take 1 tablet by mouth once daily.       No current facility-administered medications for this visit.       Past Medical History:   Diagnosis Date    Chlamydia infection      History reviewed. No pertinent surgical history.  Family History   Problem Relation Age of Onset    Ovarian cancer Maternal Grandmother 46    Breast cancer Neg Hx     Uterine cancer Neg Hx     Colon cancer Neg Hx     Cervical cancer Neg Hx      Social History     Tobacco Use    Smoking status: Never     Passive exposure: Current    Smokeless tobacco: Never   Substance Use Topics    Alcohol use: Not Currently    Drug use: Never        Review of Systems:  Review of Systems   Respiratory: Negative.     Cardiovascular: Negative.    Gastrointestinal: Negative.    Genitourinary: Negative.        OBJECTIVE:     Vital Signs (Most Recent)  BP: 114/62 (24 0935)     61.1 kg (134 lb 9.6 oz)     Physical Exam:  Physical Exam  Gen: No distress  Abdomen: Gravid, non-tender  Extremities: No edema  Cervix: 1.5/70/-2  FHT:145    BPD:36w3d  HC: 37w5d  AC: 36w6d  FL: 35w5d  EFW: 2776g, 29%tile    BPP:   ALIVIA:  16.8  Presentation: vertex    ASSESSMENT/PLAN:   1. Diet controlled gestational diabetes mellitus (GDM) in third trimester  - POCT Urinalysis, Dipstick, Automated, W/O Scope  - US OB/GYN Procedure ([facility]) - Extended List; Future  - US OB/GYN Procedure ([facility]) - Extended List  - Strep Group B by PCR  - MDL Sendout Test    2. 36 weeks gestation of pregnancy  - POCT Urinalysis, Dipstick, Automated, W/O Scope  - US OB/GYN Procedure ([facility]) - Extended List; Future  - US OB/GYN Procedure ([facility]) - Extended List  - Strep Group B by PCR  - MDL Sendout Test    3. Susceptible to Varicella (non-immune), currently pregnant in third trimester  - POCT Urinalysis, Dipstick, Automated, W/O Scope  - US OB/GYN Procedure ([facility]) - Extended List; Future  - US OB/GYN Procedure ([facility]) - Extended List    4. Routine screening for STI (sexually transmitted infection)  - MDL Sendout Test      PLAN:  Discussed delivery process and plan.  Consent given for delivery.   Discussed labor unit, kick count, pre-eclampsia, rupture   membrane precautions.   GBS  GC CZ TV collected on urine     Normal growth     Reviewed standard labor unit and kick count precautions.  Discussed pre-eclampsia precautions.  Discussed COVID-19 risks, social distancing, and isolation if respiratory symptoms develop.   DXT log reviewed, WNL  cont DXT BID alternating lunch, dinner checks   Cont ADA didet     RTC 1 week with  ob check

## 2024-03-25 ENCOUNTER — ROUTINE PRENATAL (OUTPATIENT)
Dept: OBSTETRICS AND GYNECOLOGY | Facility: CLINIC | Age: 19
End: 2024-03-25
Payer: MEDICAID

## 2024-03-25 VITALS
SYSTOLIC BLOOD PRESSURE: 114 MMHG | DIASTOLIC BLOOD PRESSURE: 62 MMHG | BODY MASS INDEX: 25.43 KG/M2 | WEIGHT: 134.63 LBS

## 2024-03-25 DIAGNOSIS — O09.893 SUSCEPTIBLE TO VARICELLA (NON-IMMUNE), CURRENTLY PREGNANT IN THIRD TRIMESTER: ICD-10-CM

## 2024-03-25 DIAGNOSIS — Z28.39 SUSCEPTIBLE TO VARICELLA (NON-IMMUNE), CURRENTLY PREGNANT IN THIRD TRIMESTER: ICD-10-CM

## 2024-03-25 DIAGNOSIS — Z11.3 ROUTINE SCREENING FOR STI (SEXUALLY TRANSMITTED INFECTION): ICD-10-CM

## 2024-03-25 DIAGNOSIS — Z3A.36 36 WEEKS GESTATION OF PREGNANCY: ICD-10-CM

## 2024-03-25 DIAGNOSIS — O24.410 DIET CONTROLLED GESTATIONAL DIABETES MELLITUS (GDM) IN THIRD TRIMESTER: Primary | ICD-10-CM

## 2024-03-25 PROCEDURE — 87653 STREP B DNA AMP PROBE: CPT | Performed by: OBSTETRICS & GYNECOLOGY

## 2024-03-25 PROCEDURE — 76819 FETAL BIOPHYS PROFIL W/O NST: CPT | Mod: ,,, | Performed by: OBSTETRICS & GYNECOLOGY

## 2024-03-25 PROCEDURE — 99214 OFFICE O/P EST MOD 30 MIN: CPT | Mod: TH,,, | Performed by: OBSTETRICS & GYNECOLOGY

## 2024-03-26 LAB — STREP B PCR (OHS): NOT DETECTED

## 2024-03-27 ENCOUNTER — HOSPITAL ENCOUNTER (OUTPATIENT)
Dept: PREADMISSION TESTING | Facility: HOSPITAL | Age: 19
Discharge: HOME | End: 2024-03-27
Payer: MEDICAID

## 2024-03-27 DIAGNOSIS — Z3A.36 36 WEEKS GESTATION OF PREGNANCY: Primary | ICD-10-CM

## 2024-03-27 LAB
BASOPHILS # BLD AUTO: 0.05 X10(3)/MCL (ref 0.01–0.08)
BASOPHILS NFR BLD AUTO: 0.5 % (ref 0.1–1.2)
EOSINOPHIL # BLD AUTO: 0.16 X10(3)/MCL (ref 0.04–0.36)
EOSINOPHIL NFR BLD AUTO: 1.5 % (ref 0.7–7)
ERYTHROCYTE [DISTWIDTH] IN BLOOD BY AUTOMATED COUNT: 12.5 % (ref 11–14.5)
HBV SURFACE AG SERPL QL IA: NEGATIVE
HBV SURFACE AG SERPL QL IA: NORMAL
HCT VFR BLD AUTO: 35.2 % (ref 36–48)
HGB BLD-MCNC: 12.2 G/DL (ref 11.8–16)
HIV 1+2 AB+HIV1 P24 AG SERPL QL IA: NONREACTIVE
IMM GRANULOCYTES # BLD AUTO: 0.07 X10(3)/MCL (ref 0–0.03)
IMM GRANULOCYTES NFR BLD AUTO: 0.6 % (ref 0–0.5)
LYMPHOCYTES # BLD AUTO: 1.96 X10(3)/MCL (ref 1.16–3.74)
LYMPHOCYTES NFR BLD AUTO: 18.2 % (ref 20–55)
MCH RBC QN AUTO: 30.8 PG (ref 27–34)
MCHC RBC AUTO-ENTMCNC: 34.7 G/DL (ref 31–37)
MCV RBC AUTO: 88.9 FL (ref 79–99)
MONOCYTES # BLD AUTO: 0.58 X10(3)/MCL (ref 0.24–0.36)
MONOCYTES NFR BLD AUTO: 5.4 % (ref 4.7–12.5)
NEUTROPHILS # BLD AUTO: 7.95 X10(3)/MCL (ref 1.56–6.13)
NEUTROPHILS NFR BLD AUTO: 73.8 % (ref 37–73)
NRBC BLD AUTO-RTO: 0 %
PLATELET # BLD AUTO: 236 X10(3)/MCL (ref 140–371)
PMV BLD AUTO: 10 FL (ref 9.4–12.4)
RBC # BLD AUTO: 3.96 X10(6)/MCL (ref 4–5.1)
T PALLIDUM AB SER QL: NONREACTIVE
WBC # SPEC AUTO: 10.77 X10(3)/MCL (ref 4–11.5)

## 2024-03-27 PROCEDURE — 86780 TREPONEMA PALLIDUM: CPT | Performed by: OBSTETRICS & GYNECOLOGY

## 2024-03-27 PROCEDURE — 87389 HIV-1 AG W/HIV-1&-2 AB AG IA: CPT | Performed by: OBSTETRICS & GYNECOLOGY

## 2024-03-27 PROCEDURE — 87340 HEPATITIS B SURFACE AG IA: CPT | Performed by: OBSTETRICS & GYNECOLOGY

## 2024-03-27 PROCEDURE — 85025 COMPLETE CBC W/AUTO DIFF WBC: CPT | Performed by: OBSTETRICS & GYNECOLOGY

## 2024-03-27 NOTE — DISCHARGE INSTRUCTIONS
Things to Bring to the Hospital:    To help make your stay as comfortable as possible you should bring the following items when you come to the hospital to deliver your baby:    1.  TOOTHBRUSH  2.  TOOTHPASTE  3.  SHAMPOO/CONDITIONER   4.  DEODORANT  5.  HAIRBRUSH OR COMB  6.  UNDERGARMENTS  7.  SOAP OR BODY WASH  8.  SANITARY NAPKINS/OVERNIGHTS IF YOU PREFER TO USE YOUR OWN PRODUCTS.   9.  NURSING PADS FOR YOUR BRA AND A BREAST PUMP IF YOU OWN ONE.     Other items needed for mom and baby include:    1.  Bras (2-3) should be packed and should be ones that were worn during pregnancy or nursing bras for breastfeeding.   2.  Gowns, slippers, and a robe.  3.  One outfit for baby to go home in as well as a blanket.  The hospital will provide baby clothes, blankets, diapers, and wipes during hospital stay.    4.  Federally approved car seat is required for the infant to go home at discharge.     OB VISITATION POLICY    The OB Department is open to family for patient visitation.  Smoking is not allowed on the premises.  Visitors must check with staff before entering a patient's room if there is a sign posted on the door or in the vieira.   Visitors may be in the room with baby if they are free from infection or any signs and symptoms of illness.  All visitors must use hand  or soap and water before touching the baby.   No children under age 12 are allowed to sleep overnight or to be left unattended in room.   Our rooms only accommodate one overnight guest.  While in labor and delivery room, you will be allowed 2 support persons if you choose and one additional guest will be allowed at the discretion of the physician or nurse.   The 2-3 labor and delivery support people may be interchangeable at the discretion of the OB nurse in charge of your care.   Any other visitors will be directed to the waiting room or post-partum room until after delivery.   No one will be allowed to stay in the labor and delivery  hallway.  After delivery, visitors will not be limited unless a procedure is in progress or your conditions warrants it.   Every day between 1:00 and 3:00 pm visitors are discouraged from entering the OB unit to encourage a mother/baby rest and bonding time.   Videos and photographs are not allowed to be taken during the delivery process. They may be taken after the baby is born and declared in good condition by nursing staff or physician.   If you will be having a delivery by , you will be allowed (1) support person in operating room unless the procedure is an emergency or under general anesthesia. No video or photography is allowed until after the procedure is complete.  The support person present will be asked to leave the operating room with the baby after delivery or if any problems arise.   Family and friends should be made familiar with the visitor policy for our facility, so that we may insure that the safety and well-being of you and your baby during this very important time.

## 2024-03-27 NOTE — PRE-PROCEDURE INSTRUCTIONS
OB pre-admit instructions given for upcoming delivery. Patient states understanding of information. Encouraged any questions and concerns. Lab work completed. Patient preference includes breast feeding and would like the doctor on call to take care of infant during hospital stay.

## 2024-04-01 NOTE — PROGRESS NOTES
HPI  18 y.o.  at 38w4d here for OB check.  Reports positive fetal movement. denies LOF. Reports occ CTX.       DXT LOG:  Fastin-77  1hr PP breakfast: 88-97  1hr PP lunch:   1hr PP dinner:       ROS  Review of Systems   Constitutional:  Negative for chills and fever.   Gastrointestinal:  Negative for abdominal pain, constipation and diarrhea.   Genitourinary:  Negative for flank pain, genital sores, pelvic pain, urgency, vaginal bleeding, vaginal discharge, vaginal pain, postcoital bleeding and vaginal odor.         OBJECTIVE  /78 (BP Location: Right arm, Patient Position: Sitting, BP Method: Medium (Manual))   Temp 97.9 °F (36.6 °C) (Temporal)   Wt 62.4 kg (137 lb 9.6 oz)   LMP 2023 (Approximate)   BMI 26.00 kg/m²     Gen: No distress  Abdomen: Gravid, non-tender  Extremities: No edema  Cervix: 2/80/-2  FHT: 135  FH: 38    ASSESSMENT  1. Diet controlled gestational diabetes mellitus (GDM) in third trimester    2. 38 weeks gestation of pregnancy  - POCT Urinalysis, Dipstick, Automated, W/O Scope    3. Susceptible to Varicella (non-immune), currently pregnant in third trimester        PLAN  Reviewed standard labor unit and kick count precautions.  Discussed pre-eclampsia precautions.  Discussed COVID-19 risks, social distancing, and isolation if respiratory symptoms develop.     DXT log reviewed, WNL  cont DXT BID alternating lunch, dinner checks   Cont ADA didet      RTC 1 week ob check     This note was transcribed by Stephani Claros. There may be transcription errors as a result, however minimal. Effort has been made to ensure accuracy of transcription, but any obvious errors or omissions should be clarified with the author of the document.

## 2024-04-02 ENCOUNTER — ROUTINE PRENATAL (OUTPATIENT)
Dept: OBSTETRICS AND GYNECOLOGY | Facility: CLINIC | Age: 19
End: 2024-04-02
Payer: MEDICAID

## 2024-04-02 VITALS
DIASTOLIC BLOOD PRESSURE: 68 MMHG | BODY MASS INDEX: 25.89 KG/M2 | TEMPERATURE: 97 F | WEIGHT: 137 LBS | SYSTOLIC BLOOD PRESSURE: 106 MMHG

## 2024-04-02 DIAGNOSIS — O24.410 DIET CONTROLLED GESTATIONAL DIABETES MELLITUS (GDM) IN THIRD TRIMESTER: Primary | ICD-10-CM

## 2024-04-02 DIAGNOSIS — Z28.39 SUSCEPTIBLE TO VARICELLA (NON-IMMUNE), CURRENTLY PREGNANT IN THIRD TRIMESTER: ICD-10-CM

## 2024-04-02 DIAGNOSIS — Z3A.37 37 WEEKS GESTATION OF PREGNANCY: ICD-10-CM

## 2024-04-02 DIAGNOSIS — O09.893 SUSCEPTIBLE TO VARICELLA (NON-IMMUNE), CURRENTLY PREGNANT IN THIRD TRIMESTER: ICD-10-CM

## 2024-04-02 PROCEDURE — 99213 OFFICE O/P EST LOW 20 MIN: CPT | Mod: TH,,,

## 2024-04-09 ENCOUNTER — ROUTINE PRENATAL (OUTPATIENT)
Dept: OBSTETRICS AND GYNECOLOGY | Facility: CLINIC | Age: 19
End: 2024-04-09
Payer: MEDICAID

## 2024-04-09 VITALS
WEIGHT: 137.63 LBS | BODY MASS INDEX: 26 KG/M2 | DIASTOLIC BLOOD PRESSURE: 78 MMHG | SYSTOLIC BLOOD PRESSURE: 110 MMHG | TEMPERATURE: 98 F

## 2024-04-09 DIAGNOSIS — Z28.39 SUSCEPTIBLE TO VARICELLA (NON-IMMUNE), CURRENTLY PREGNANT IN THIRD TRIMESTER: ICD-10-CM

## 2024-04-09 DIAGNOSIS — Z3A.38 38 WEEKS GESTATION OF PREGNANCY: Primary | ICD-10-CM

## 2024-04-09 DIAGNOSIS — O24.410 DIET CONTROLLED GESTATIONAL DIABETES MELLITUS (GDM) IN THIRD TRIMESTER: ICD-10-CM

## 2024-04-09 DIAGNOSIS — O09.893 SUSCEPTIBLE TO VARICELLA (NON-IMMUNE), CURRENTLY PREGNANT IN THIRD TRIMESTER: ICD-10-CM

## 2024-04-09 PROCEDURE — 99214 OFFICE O/P EST MOD 30 MIN: CPT | Mod: TH,,, | Performed by: OBSTETRICS & GYNECOLOGY

## 2024-04-09 NOTE — PROGRESS NOTES
Chief Complaint:  Routine Prenatal Visit (37.4 wks. Pt does want a pelvic exam)    History of Present Illness:  Laury Garrett is a 18 y.o. year old  at 37w4d presents for her ob exam. She is doing well. +fetal movement. Denies vaginal bleeding, vaginal discharge, LOF or contractions. Negative preeclampsia ROS.      Review of Systems:  General/Constitutional: Fever denies. Headache denies.    Skin: Rash denies.   Respiratory: Cough denies. SOB denies. Wheezing denies .   Cardiovascular: Chest pain denies. Dizziness denies. Palpitations denies. Swelling in hands/feet denies.    Gastrointestinal: Abdominal pain denies. Constipation denies. Diarrhea denies. Heartburn denies. Nausea denies. Vomiting denies.    Genitourinary: Painful urination denies.   Gynecologic: Vaginal bleeding denies. Vaginal discharge denies. Leaking amniotic fluid denies. Contractions denies.    Psychiatric: Depressed mood denies. Suicidal thoughts denies.       Current Outpatient Medications:     blood sugar diagnostic Strp, 1 strip by Misc.(Non-Drug; Combo Route) route 4 (four) times daily., Disp: 200 strip, Rfl: 1    blood-glucose meter kit, Use as instructed, Disp: 1 each, Rfl: 0    lancets (ACCU-CHEK SOFTCLIX LANCETS) Misc, 1 Lancet by Misc.(Non-Drug; Combo Route) route 4 (four) times daily., Disp: 200 each, Rfl: 1    prenatal no115/iron/folic acid (PRENATAL 19 ORAL), Take 1 tablet by mouth once daily., Disp: , Rfl:       Physical Exam:  /68 (BP Location: Right arm, Patient Position: Sitting, BP Method: Medium (Manual))   Temp 96.8 °F (36 °C) (Temporal)   Wt 62.1 kg (137 lb)   LMP 2023 (Approximate)   BMI 25.89 kg/m²     Constitutional: General appearance: healthy, well-nourished and well-developed   Psychiatric:  Orientation to time, place and person. Normal mood and affect and active, alert   Abdomen: Auscultation/Inspection/Palpation: Gravid. No tenderness or masses. Soft, nondistended   Extremities: no  CCE    Cvx: 2/60/-2  FH: 37  FHT: 140      Assessment/Plan  1. Diet controlled gestational diabetes mellitus (GDM) in third trimester    2. 37 weeks gestation of pregnancy  -     POCT Urinalysis, Dipstick, Automated, W/O Scope    3. Susceptible to Varicella (non-immune), currently pregnant in third trimester       PLAN:  Reviewed standard labor unit and kick count precautions.  Discussed pre-eclampsia precautions.  Discussed COVID-19 risks, social distancing, and isolation if respiratory symptoms develop.      DXT log reviewed, WNL  cont DXT BID alternating lunch, dinner checks   Cont ADA didet     RTC 1 week with TERESA ob check

## 2024-04-11 NOTE — PROGRESS NOTES
HPI  18 y.o.  at 39w4d here for OB check. Doing well.  Occ ctx.   Reports positive fetal movement. lorne VINCENT.     DXT LOG:  Fastin-75  1hr PP breakfast:   1hr PP lunch:   1hr PP dinner:     ROS  Review of Systems   Constitutional:  Negative for chills and fever.   Gastrointestinal:  Negative for abdominal pain, constipation and diarrhea.   Genitourinary:  Negative for flank pain, genital sores, pelvic pain, urgency, vaginal bleeding, vaginal discharge, vaginal pain, postcoital bleeding and vaginal odor.         OBJECTIVE  /66   Wt 62.1 kg (137 lb)   LMP 2023 (Approximate)   BMI 25.89 kg/m²     Gen: No distress  Abdomen: Gravid, non-tender  Extremities: No edema  Cervix: 80/-1  FHT: 135    ASSESSMENT  1. Diet controlled gestational diabetes mellitus (GDM) in third trimester    2. 39 weeks gestation of pregnancy  - POCT Urinalysis, Dipstick, Automated, W/O Scope    3. Susceptible to Varicella (non-immune), currently pregnant in third trimester        PLAN  Reviewed standard labor unit and kick count precautions.  Discussed pre-eclampsia precautions.  Discussed COVID-19 risks, social distancing, and isolation if respiratory symptoms develop.     DXT log reviewed, WNL  cont DXT BID alternating lunch, dinner checks   Cont ADA diet     Desires induction  Will plan for induction 24  Cvx favorable  Report to [facility] 6am    RTC post partum     This note was transcribed by Stephani Claros. There may be transcription errors as a result, however minimal. Effort has been made to ensure accuracy of transcription, but any obvious errors or omissions should be clarified with the author of the document.

## 2024-04-16 ENCOUNTER — ROUTINE PRENATAL (OUTPATIENT)
Dept: OBSTETRICS AND GYNECOLOGY | Facility: CLINIC | Age: 19
End: 2024-04-16
Payer: MEDICAID

## 2024-04-16 VITALS — WEIGHT: 137 LBS | DIASTOLIC BLOOD PRESSURE: 66 MMHG | BODY MASS INDEX: 25.89 KG/M2 | SYSTOLIC BLOOD PRESSURE: 104 MMHG

## 2024-04-16 DIAGNOSIS — O24.410 DIET CONTROLLED GESTATIONAL DIABETES MELLITUS (GDM) IN THIRD TRIMESTER: Primary | ICD-10-CM

## 2024-04-16 DIAGNOSIS — O09.893 SUSCEPTIBLE TO VARICELLA (NON-IMMUNE), CURRENTLY PREGNANT IN THIRD TRIMESTER: ICD-10-CM

## 2024-04-16 DIAGNOSIS — Z28.39 SUSCEPTIBLE TO VARICELLA (NON-IMMUNE), CURRENTLY PREGNANT IN THIRD TRIMESTER: ICD-10-CM

## 2024-04-16 DIAGNOSIS — Z3A.39 39 WEEKS GESTATION OF PREGNANCY: ICD-10-CM

## 2024-04-16 PROBLEM — Z3A.38 38 WEEKS GESTATION OF PREGNANCY: Status: RESOLVED | Noted: 2024-04-09 | Resolved: 2024-04-16

## 2024-04-16 PROCEDURE — 99213 OFFICE O/P EST LOW 20 MIN: CPT | Mod: TH,,, | Performed by: OBSTETRICS & GYNECOLOGY

## 2024-04-19 ENCOUNTER — ANESTHESIA EVENT (OUTPATIENT)
Dept: OBSTETRICS AND GYNECOLOGY | Facility: HOSPITAL | Age: 19
End: 2024-04-19
Payer: MEDICAID

## 2024-04-19 ENCOUNTER — ANESTHESIA (OUTPATIENT)
Dept: OBSTETRICS AND GYNECOLOGY | Facility: HOSPITAL | Age: 19
End: 2024-04-19
Payer: MEDICAID

## 2024-04-19 ENCOUNTER — HOSPITAL ENCOUNTER (INPATIENT)
Facility: HOSPITAL | Age: 19
LOS: 3 days | Discharge: HOME | End: 2024-04-22
Attending: OBSTETRICS & GYNECOLOGY | Admitting: OBSTETRICS & GYNECOLOGY
Payer: MEDICAID

## 2024-04-19 DIAGNOSIS — O24.410 DIET CONTROLLED GESTATIONAL DIABETES MELLITUS (GDM) IN THIRD TRIMESTER: ICD-10-CM

## 2024-04-19 DIAGNOSIS — Z34.90 PREGNANCY: ICD-10-CM

## 2024-04-19 PROBLEM — Z3A.40 40 WEEKS GESTATION OF PREGNANCY: Status: ACTIVE | Noted: 2024-04-19

## 2024-04-19 LAB
ABO AND RH: NORMAL
ANTIBODY SCREEN: NORMAL
BASOPHILS # BLD AUTO: 0.05 X10(3)/MCL (ref 0.01–0.08)
BASOPHILS NFR BLD AUTO: 0.5 % (ref 0.1–1.2)
EOSINOPHIL # BLD AUTO: 0.17 X10(3)/MCL (ref 0.04–0.36)
EOSINOPHIL NFR BLD AUTO: 1.6 % (ref 0.7–7)
ERYTHROCYTE [DISTWIDTH] IN BLOOD BY AUTOMATED COUNT: 13 % (ref 11–14.5)
GLUCOSE SERPL-MCNC: 64 MG/DL (ref 70–110)
GLUCOSE SERPL-MCNC: 71 MG/DL (ref 70–110)
HCT VFR BLD AUTO: 34.8 % (ref 36–48)
HGB BLD-MCNC: 12.1 G/DL (ref 11.8–16)
IMM GRANULOCYTES # BLD AUTO: 0.06 X10(3)/MCL (ref 0–0.03)
IMM GRANULOCYTES NFR BLD AUTO: 0.6 % (ref 0–0.5)
LYMPHOCYTES # BLD AUTO: 2.59 X10(3)/MCL (ref 1.16–3.74)
LYMPHOCYTES NFR BLD AUTO: 25 % (ref 20–55)
MCH RBC QN AUTO: 30.9 PG (ref 27–34)
MCHC RBC AUTO-ENTMCNC: 34.8 G/DL (ref 31–37)
MCV RBC AUTO: 89 FL (ref 79–99)
MONOCYTES # BLD AUTO: 0.9 X10(3)/MCL (ref 0.24–0.36)
MONOCYTES NFR BLD AUTO: 8.7 % (ref 4.7–12.5)
NEUTROPHILS # BLD AUTO: 6.58 X10(3)/MCL (ref 1.56–6.13)
NEUTROPHILS NFR BLD AUTO: 63.6 % (ref 37–73)
NRBC BLD AUTO-RTO: 0 %
PLATELET # BLD AUTO: 226 X10(3)/MCL (ref 140–371)
PMV BLD AUTO: 9.9 FL (ref 9.4–12.4)
POCT GLUCOSE: 64 MG/DL (ref 70–110)
POCT GLUCOSE: 71 MG/DL (ref 70–110)
RBC # BLD AUTO: 3.91 X10(6)/MCL (ref 4–5.1)
SPECIMEN OUTDATE: NORMAL
WBC # SPEC AUTO: 10.35 X10(3)/MCL (ref 4–11.5)

## 2024-04-19 PROCEDURE — 86850 RBC ANTIBODY SCREEN: CPT | Performed by: OBSTETRICS & GYNECOLOGY

## 2024-04-19 PROCEDURE — 36415 COLL VENOUS BLD VENIPUNCTURE: CPT | Performed by: OBSTETRICS & GYNECOLOGY

## 2024-04-19 PROCEDURE — 0KQM0ZZ REPAIR PERINEUM MUSCLE, OPEN APPROACH: ICD-10-PCS | Performed by: OBSTETRICS & GYNECOLOGY

## 2024-04-19 PROCEDURE — 25000003 PHARM REV CODE 250: Performed by: NURSE ANESTHETIST, CERTIFIED REGISTERED

## 2024-04-19 PROCEDURE — 0UQMXZZ REPAIR VULVA, EXTERNAL APPROACH: ICD-10-PCS | Performed by: OBSTETRICS & GYNECOLOGY

## 2024-04-19 PROCEDURE — 11000001 HC ACUTE MED/SURG PRIVATE ROOM

## 2024-04-19 PROCEDURE — 62326 NJX INTERLAMINAR LMBR/SAC: CPT | Performed by: NURSE ANESTHETIST, CERTIFIED REGISTERED

## 2024-04-19 PROCEDURE — 63600175 PHARM REV CODE 636 W HCPCS: Performed by: OBSTETRICS & GYNECOLOGY

## 2024-04-19 PROCEDURE — 85025 COMPLETE CBC W/AUTO DIFF WBC: CPT | Performed by: OBSTETRICS & GYNECOLOGY

## 2024-04-19 PROCEDURE — 25000003 PHARM REV CODE 250: Performed by: OBSTETRICS & GYNECOLOGY

## 2024-04-19 PROCEDURE — 3E033VJ INTRODUCTION OF OTHER HORMONE INTO PERIPHERAL VEIN, PERCUTANEOUS APPROACH: ICD-10-PCS | Performed by: OBSTETRICS & GYNECOLOGY

## 2024-04-19 PROCEDURE — 59409 OBSTETRICAL CARE: CPT | Mod: QZ,,, | Performed by: NURSE ANESTHETIST, CERTIFIED REGISTERED

## 2024-04-19 PROCEDURE — 10907ZC DRAINAGE OF AMNIOTIC FLUID, THERAPEUTIC FROM PRODUCTS OF CONCEPTION, VIA NATURAL OR ARTIFICIAL OPENING: ICD-10-PCS | Performed by: OBSTETRICS & GYNECOLOGY

## 2024-04-19 PROCEDURE — 63600175 PHARM REV CODE 636 W HCPCS: Performed by: NURSE ANESTHETIST, CERTIFIED REGISTERED

## 2024-04-19 RX ORDER — BUTORPHANOL TARTRATE 2 MG/ML
1 INJECTION INTRAMUSCULAR; INTRAVENOUS
Status: DISCONTINUED | OUTPATIENT
Start: 2024-04-19 | End: 2024-04-21

## 2024-04-19 RX ORDER — LIDOCAINE HYDROCHLORIDE 10 MG/ML
10 INJECTION INFILTRATION; PERINEURAL ONCE AS NEEDED
Status: DISCONTINUED | OUTPATIENT
Start: 2024-04-19 | End: 2024-04-21

## 2024-04-19 RX ORDER — MISOPROSTOL 100 UG/1
800 TABLET ORAL ONCE AS NEEDED
Status: DISCONTINUED | OUTPATIENT
Start: 2024-04-19 | End: 2024-04-21

## 2024-04-19 RX ORDER — ACETAMINOPHEN 325 MG/1
650 TABLET ORAL EVERY 6 HOURS PRN
Status: DISCONTINUED | OUTPATIENT
Start: 2024-04-19 | End: 2024-04-21

## 2024-04-19 RX ORDER — ROPIVACAINE HYDROCHLORIDE 2 MG/ML
INJECTION, SOLUTION EPIDURAL; INFILTRATION CONTINUOUS PRN
Status: DISCONTINUED | OUTPATIENT
Start: 2024-04-19 | End: 2024-04-20

## 2024-04-19 RX ORDER — LIDOCAINE HCL/EPINEPHRINE/PF 2%-1:200K
VIAL (ML) INJECTION
Status: COMPLETED
Start: 2024-04-19 | End: 2024-04-19

## 2024-04-19 RX ORDER — DIPHENOXYLATE HYDROCHLORIDE AND ATROPINE SULFATE 2.5; .025 MG/1; MG/1
2 TABLET ORAL EVERY 6 HOURS PRN
Status: DISCONTINUED | OUTPATIENT
Start: 2024-04-19 | End: 2024-04-20

## 2024-04-19 RX ORDER — ROPIVACAINE HYDROCHLORIDE 2 MG/ML
10 INJECTION, SOLUTION EPIDURAL; INFILTRATION CONTINUOUS
Status: DISCONTINUED | OUTPATIENT
Start: 2024-04-19 | End: 2024-04-21

## 2024-04-19 RX ORDER — OXYTOCIN/RINGER'S LACTATE 30/500 ML
334 PLASTIC BAG, INJECTION (ML) INTRAVENOUS ONCE AS NEEDED
Status: DISCONTINUED | OUTPATIENT
Start: 2024-04-19 | End: 2024-04-21

## 2024-04-19 RX ORDER — SODIUM CHLORIDE, SODIUM LACTATE, POTASSIUM CHLORIDE, CALCIUM CHLORIDE 600; 310; 30; 20 MG/100ML; MG/100ML; MG/100ML; MG/100ML
INJECTION, SOLUTION INTRAVENOUS CONTINUOUS
Status: DISCONTINUED | OUTPATIENT
Start: 2024-04-19 | End: 2024-04-21

## 2024-04-19 RX ORDER — LIDOCAINE HCL/EPINEPHRINE/PF 2%-1:200K
6 VIAL (ML) INJECTION ONCE
Status: DISCONTINUED | OUTPATIENT
Start: 2024-04-19 | End: 2024-04-21

## 2024-04-19 RX ORDER — SIMETHICONE 80 MG
1 TABLET,CHEWABLE ORAL 4 TIMES DAILY PRN
Status: DISCONTINUED | OUTPATIENT
Start: 2024-04-19 | End: 2024-04-20

## 2024-04-19 RX ORDER — ONDANSETRON 4 MG/1
8 TABLET, ORALLY DISINTEGRATING ORAL EVERY 8 HOURS PRN
Status: DISCONTINUED | OUTPATIENT
Start: 2024-04-19 | End: 2024-04-20

## 2024-04-19 RX ORDER — OXYTOCIN/RINGER'S LACTATE 30/500 ML
95 PLASTIC BAG, INJECTION (ML) INTRAVENOUS ONCE
Status: DISCONTINUED | OUTPATIENT
Start: 2024-04-19 | End: 2024-04-19

## 2024-04-19 RX ORDER — METHYLERGONOVINE MALEATE 0.2 MG/ML
200 INJECTION INTRAVENOUS ONCE AS NEEDED
Status: DISCONTINUED | OUTPATIENT
Start: 2024-04-19 | End: 2024-04-22 | Stop reason: HOSPADM

## 2024-04-19 RX ORDER — OXYTOCIN 10 [USP'U]/ML
10 INJECTION, SOLUTION INTRAMUSCULAR; INTRAVENOUS ONCE AS NEEDED
Status: DISCONTINUED | OUTPATIENT
Start: 2024-04-19 | End: 2024-04-21

## 2024-04-19 RX ORDER — LIDOCAINE HCL/EPINEPHRINE/PF 2%-1:200K
VIAL (ML) INJECTION
Status: DISCONTINUED | OUTPATIENT
Start: 2024-04-19 | End: 2024-04-20

## 2024-04-19 RX ORDER — MISOPROSTOL 100 MCG
25 TABLET ORAL EVERY 4 HOURS PRN
Status: DISCONTINUED | OUTPATIENT
Start: 2024-04-19 | End: 2024-04-21

## 2024-04-19 RX ORDER — SODIUM CHLORIDE 9 MG/ML
INJECTION, SOLUTION INTRAVENOUS
Status: DISCONTINUED | OUTPATIENT
Start: 2024-04-19 | End: 2024-04-21

## 2024-04-19 RX ORDER — OXYTOCIN/RINGER'S LACTATE 30/500 ML
95 PLASTIC BAG, INJECTION (ML) INTRAVENOUS ONCE AS NEEDED
Status: DISCONTINUED | OUTPATIENT
Start: 2024-04-19 | End: 2024-04-21

## 2024-04-19 RX ORDER — BUTORPHANOL TARTRATE 2 MG/ML
2 INJECTION INTRAMUSCULAR; INTRAVENOUS
Status: DISCONTINUED | OUTPATIENT
Start: 2024-04-19 | End: 2024-04-22 | Stop reason: HOSPADM

## 2024-04-19 RX ORDER — TERBUTALINE SULFATE 1 MG/ML
0.25 INJECTION SUBCUTANEOUS
Status: DISCONTINUED | OUTPATIENT
Start: 2024-04-19 | End: 2024-04-21

## 2024-04-19 RX ORDER — OXYTOCIN/RINGER'S LACTATE 30/500 ML
0-30 PLASTIC BAG, INJECTION (ML) INTRAVENOUS CONTINUOUS
Status: DISCONTINUED | OUTPATIENT
Start: 2024-04-19 | End: 2024-04-21

## 2024-04-19 RX ORDER — TRANEXAMIC ACID 10 MG/ML
1000 INJECTION, SOLUTION INTRAVENOUS EVERY 30 MIN PRN
Status: DISCONTINUED | OUTPATIENT
Start: 2024-04-19 | End: 2024-04-21

## 2024-04-19 RX ORDER — OXYTOCIN/RINGER'S LACTATE 30/500 ML
334 PLASTIC BAG, INJECTION (ML) INTRAVENOUS ONCE
Status: DISCONTINUED | OUTPATIENT
Start: 2024-04-19 | End: 2024-04-19

## 2024-04-19 RX ORDER — ROPIVACAINE HYDROCHLORIDE 2 MG/ML
INJECTION, SOLUTION EPIDURAL; INFILTRATION; PERINEURAL
Status: COMPLETED
Start: 2024-04-19 | End: 2024-04-19

## 2024-04-19 RX ORDER — CARBOPROST TROMETHAMINE 250 UG/ML
250 INJECTION, SOLUTION INTRAMUSCULAR
Status: DISCONTINUED | OUTPATIENT
Start: 2024-04-19 | End: 2024-04-21

## 2024-04-19 RX ORDER — MISOPROSTOL 100 UG/1
800 TABLET ORAL ONCE AS NEEDED
Status: COMPLETED | OUTPATIENT
Start: 2024-04-19 | End: 2024-04-20

## 2024-04-19 RX ORDER — LIDOCAINE HYDROCHLORIDE AND EPINEPHRINE 15; 5 MG/ML; UG/ML
INJECTION, SOLUTION EPIDURAL
Status: DISCONTINUED | OUTPATIENT
Start: 2024-04-19 | End: 2024-04-20

## 2024-04-19 RX ORDER — CALCIUM CARBONATE 200(500)MG
500 TABLET,CHEWABLE ORAL 3 TIMES DAILY PRN
Status: DISCONTINUED | OUTPATIENT
Start: 2024-04-19 | End: 2024-04-22 | Stop reason: HOSPADM

## 2024-04-19 RX ORDER — MUPIROCIN 20 MG/G
OINTMENT TOPICAL
Status: DISCONTINUED | OUTPATIENT
Start: 2024-04-19 | End: 2024-04-21

## 2024-04-19 RX ORDER — CEFAZOLIN SODIUM 2 G/50ML
2 SOLUTION INTRAVENOUS ONCE AS NEEDED
Status: DISCONTINUED | OUTPATIENT
Start: 2024-04-19 | End: 2024-04-21

## 2024-04-19 RX ADMIN — Medication 4 MILLI-UNITS/MIN: at 08:04

## 2024-04-19 RX ADMIN — ONDANSETRON 8 MG: 4 TABLET, ORALLY DISINTEGRATING ORAL at 10:04

## 2024-04-19 RX ADMIN — LIDOCAINE HYDROCHLORIDE AND EPINEPHRINE 3 ML: 20; 5 INJECTION, SOLUTION EPIDURAL; INFILTRATION; INTRACAUDAL; PERINEURAL at 07:04

## 2024-04-19 RX ADMIN — LIDOCAINE HYDROCHLORIDE AND EPINEPHRINE 2 ML: 15; 5 INJECTION, SOLUTION EPIDURAL at 05:04

## 2024-04-19 RX ADMIN — LIDOCAINE HYDROCHLORIDE AND EPINEPHRINE 3 ML: 20; 5 INJECTION, SOLUTION EPIDURAL; INFILTRATION; INTRACAUDAL; PERINEURAL at 05:04

## 2024-04-19 RX ADMIN — ROPIVACAINE HYDROCHLORIDE 10 ML/HR: 2 INJECTION, SOLUTION EPIDURAL; INFILTRATION at 05:04

## 2024-04-19 RX ADMIN — TERBUTALINE SULFATE 0.25 MG: 1 INJECTION SUBCUTANEOUS at 03:04

## 2024-04-19 RX ADMIN — LIDOCAINE HYDROCHLORIDE AND EPINEPHRINE 3 ML: 15; 5 INJECTION, SOLUTION EPIDURAL at 05:04

## 2024-04-19 RX ADMIN — SODIUM CHLORIDE, POTASSIUM CHLORIDE, SODIUM LACTATE AND CALCIUM CHLORIDE 1500 ML: 600; 310; 30; 20 INJECTION, SOLUTION INTRAVENOUS at 08:04

## 2024-04-19 NOTE — INTERVAL H&P NOTE
The patient has been examined and the H&P has been reviewed:    Gen: NAD  Abd: Gravid, NT  Ext: no CCE  Pelvis: NEFG  Cvx: 2/3/90/-1  AROM with large straw colored fluid    NST: rx cat 1  [place]: CTX q 2-3    Active Hospital Problems    Diagnosis  POA    *Diet controlled gestational diabetes mellitus (GDM) in third trimester [O24.410]  Yes    40 weeks gestation of pregnancy [Z3A.40]  Not Applicable    Susceptible to Varicella (non-immune), currently pregnant in third trimester [O09.893, Z28.39]  Not Applicable      Resolved Hospital Problems   No resolved problems to display.     Admit for labor induction  Continuous maternal and fetal monitoring  Control severe range pressures with IV meds as needed  Epidural when ready  Pitocin per protocol as needed  IV fluids  Anticipate vaginal delivery

## 2024-04-19 NOTE — ANESTHESIA PROCEDURE NOTES
Epidural    Patient location during procedure: OB   Reason for block: primary anesthetic   Reason for block: at surgeon's request, post-op pain management  Diagnosis: Active Labor   Start time: 4/19/2024 3:02 PM  Timeout: 4/19/2024 4:47 PM  End time: 4/19/2024 3:09 PM    Staffing  Performing Provider: Cricket Domínguez CRNA  Authorizing Provider: Cricket Domínguez CRNA    Staffing  Performed by: Cricket Domínguez CRNA  Authorized by: Cricket Domínguez CRNA        Preanesthetic Checklist  Completed: patient identified, IV checked, site marked, surgical consent, monitors and equipment checked, pre-op evaluation, timeout performed, anesthesia consent given, hand hygiene performed and patient being monitored  Preparation  Patient position: sitting  Prep: ChloraPrep  Patient monitoring: Pulse Ox and Blood Pressure  Reason for block: primary anesthetic   Epidural  Skin Anesthetic: lidocaine 1%  Administration type: single shot  Approach: midline  Interspace: L4-5    Block type: caudal.  Needle and Epidural Catheter  Needle type: Tuohy   Needle gauge: 18  Needle length: 5.0 inches  Needle insertion depth: 2.5 cm  Catheter type: multi-orifice  Catheter size: 20 G  Insertion Attempts: 1  Test dose: 3 mL of lidocaine 1.5% with Epi 1-to-200,000  Additional Documentation: incremental injection, negative aspiration for heme and CSF and no paresthesia on injection  Needle localization: anatomical landmarks  Assessment  Ease of block: easy  Patient's tolerance of the procedure: comfortable throughout block No inadvertent dural puncture with Tuohy.  Dural puncture not performed with spinal needle

## 2024-04-19 NOTE — ANESTHESIA PREPROCEDURE EVALUATION
04/19/2024  Laury Garrett is a 18 y.o., female.      Pre-op Assessment    I have reviewed the Patient Summary Reports.     I have reviewed the Nursing Notes. I have reviewed the NPO Status.   I have reviewed the Medications.     Review of Systems  Anesthesia Hx:  No problems with previous Anesthesia             Denies Family Hx of Anesthesia complications.    Denies Personal Hx of Anesthesia complications.                    Social:  Non-Smoker       Hematology/Oncology:  Hematology Normal   Oncology Normal                                   EENT/Dental:  EENT/Dental Normal           Cardiovascular:  Cardiovascular Normal Exercise tolerance: good                                           Pulmonary:  Pulmonary Normal                       Renal/:   renal calculi               Hepatic/GI:     GERD             Musculoskeletal:  Musculoskeletal Normal                Neurological:  Neurology Normal                                      Endocrine:  Diabetes           Dermatological:  Skin Normal    Psych:  Psychiatric Normal                    Physical Exam  General: Well nourished, Cooperative, Alert and Oriented    Airway:  Mallampati: II / II  Mouth Opening: Normal  TM Distance: Normal  Tongue: Normal  Neck ROM: Normal ROM    Dental:  Intact        Anesthesia Plan  Type of Anesthesia, risks & benefits discussed:    Anesthesia Type: Epidural  Intra-op Monitoring Plan: Standard ASA Monitors  Post Op Pain Control Plan: multimodal analgesia  Induction:  IV  Airway Plan: Direct  Informed Consent: Informed consent signed with the Patient and all parties understand the risks and agree with anesthesia plan.  All questions answered. Patient consented to blood products? Yes  ASA Score: 3  Day of Surgery Review of History & Physical: H&P Update referred to the surgeon/provider.I have interviewed and examined the  patient. I have reviewed the patient's H&P dated: There are no significant changes.     Ready For Surgery From Anesthesia Perspective.     .

## 2024-04-20 LAB
BASOPHILS # BLD AUTO: 0.03 X10(3)/MCL (ref 0.01–0.08)
BASOPHILS NFR BLD AUTO: 0.2 % (ref 0.1–1.2)
EOSINOPHIL # BLD AUTO: 0.05 X10(3)/MCL (ref 0.04–0.36)
EOSINOPHIL NFR BLD AUTO: 0.3 % (ref 0.7–7)
ERYTHROCYTE [DISTWIDTH] IN BLOOD BY AUTOMATED COUNT: 13.1 % (ref 11–14.5)
HCT VFR BLD AUTO: 29.5 % (ref 36–48)
HGB BLD-MCNC: 10.3 G/DL (ref 11.8–16)
IMM GRANULOCYTES # BLD AUTO: 0.08 X10(3)/MCL (ref 0–0.03)
IMM GRANULOCYTES NFR BLD AUTO: 0.5 % (ref 0–0.5)
LYMPHOCYTES # BLD AUTO: 1.54 X10(3)/MCL (ref 1.16–3.74)
LYMPHOCYTES NFR BLD AUTO: 9.9 % (ref 20–55)
MCH RBC QN AUTO: 30.9 PG (ref 27–34)
MCHC RBC AUTO-ENTMCNC: 34.9 G/DL (ref 31–37)
MCV RBC AUTO: 88.6 FL (ref 79–99)
MONOCYTES # BLD AUTO: 1.4 X10(3)/MCL (ref 0.24–0.36)
MONOCYTES NFR BLD AUTO: 9 % (ref 4.7–12.5)
NEUTROPHILS # BLD AUTO: 12.47 X10(3)/MCL (ref 1.56–6.13)
NEUTROPHILS NFR BLD AUTO: 80.1 % (ref 37–73)
NRBC BLD AUTO-RTO: 0 %
PLATELET # BLD AUTO: 176 X10(3)/MCL (ref 140–371)
PMV BLD AUTO: 10.7 FL (ref 9.4–12.4)
POCT GLUCOSE: 81 MG/DL (ref 70–110)
RBC # BLD AUTO: 3.33 X10(6)/MCL (ref 4–5.1)
WBC # SPEC AUTO: 15.57 X10(3)/MCL (ref 4–11.5)

## 2024-04-20 PROCEDURE — 25000003 PHARM REV CODE 250: Performed by: OBSTETRICS & GYNECOLOGY

## 2024-04-20 PROCEDURE — 36415 COLL VENOUS BLD VENIPUNCTURE: CPT | Performed by: OBSTETRICS & GYNECOLOGY

## 2024-04-20 PROCEDURE — 51702 INSERT TEMP BLADDER CATH: CPT

## 2024-04-20 PROCEDURE — 85025 COMPLETE CBC W/AUTO DIFF WBC: CPT | Performed by: OBSTETRICS & GYNECOLOGY

## 2024-04-20 PROCEDURE — 25000003 PHARM REV CODE 250

## 2024-04-20 PROCEDURE — 59409 OBSTETRICAL CARE: CPT | Mod: GB,,, | Performed by: OBSTETRICS & GYNECOLOGY

## 2024-04-20 PROCEDURE — 72100003 HC LABOR CARE, EA. ADDL. 8 HRS

## 2024-04-20 PROCEDURE — 72200004 HC VAGINAL DELIVERY LEVEL I

## 2024-04-20 PROCEDURE — 72100002 HC LABOR CARE, 1ST 8 HOURS

## 2024-04-20 PROCEDURE — 11000001 HC ACUTE MED/SURG PRIVATE ROOM

## 2024-04-20 RX ORDER — MISOPROSTOL 100 UG/1
800 TABLET ORAL ONCE AS NEEDED
Status: DISCONTINUED | OUTPATIENT
Start: 2024-04-20 | End: 2024-04-22 | Stop reason: HOSPADM

## 2024-04-20 RX ORDER — ALUMINUM HYDROXIDE, MAGNESIUM HYDROXIDE, AND SIMETHICONE 1200; 120; 1200 MG/30ML; MG/30ML; MG/30ML
30 SUSPENSION ORAL EVERY 6 HOURS PRN
Status: DISCONTINUED | OUTPATIENT
Start: 2024-04-20 | End: 2024-04-22 | Stop reason: HOSPADM

## 2024-04-20 RX ORDER — DOCUSATE SODIUM 100 MG/1
200 CAPSULE, LIQUID FILLED ORAL 2 TIMES DAILY PRN
Status: DISCONTINUED | OUTPATIENT
Start: 2024-04-20 | End: 2024-04-20

## 2024-04-20 RX ORDER — HYDROCODONE BITARTRATE AND ACETAMINOPHEN 7.5; 325 MG/1; MG/1
1 TABLET ORAL EVERY 4 HOURS PRN
Status: DISCONTINUED | OUTPATIENT
Start: 2024-04-20 | End: 2024-04-22 | Stop reason: HOSPADM

## 2024-04-20 RX ORDER — ACETAMINOPHEN 325 MG/1
650 TABLET ORAL EVERY 6 HOURS PRN
Status: DISCONTINUED | OUTPATIENT
Start: 2024-04-20 | End: 2024-04-22 | Stop reason: HOSPADM

## 2024-04-20 RX ORDER — DIPHENOXYLATE HYDROCHLORIDE AND ATROPINE SULFATE 2.5; .025 MG/1; MG/1
2 TABLET ORAL EVERY 6 HOURS PRN
Status: DISCONTINUED | OUTPATIENT
Start: 2024-04-20 | End: 2024-04-22 | Stop reason: HOSPADM

## 2024-04-20 RX ORDER — OXYTOCIN/RINGER'S LACTATE 30/500 ML
95 PLASTIC BAG, INJECTION (ML) INTRAVENOUS ONCE
Status: DISCONTINUED | OUTPATIENT
Start: 2024-04-20 | End: 2024-04-22 | Stop reason: HOSPADM

## 2024-04-20 RX ORDER — PRENATAL WITH FERROUS FUM AND FOLIC ACID 3080; 920; 120; 400; 22; 1.84; 3; 20; 10; 1; 12; 200; 27; 25; 2 [IU]/1; [IU]/1; MG/1; [IU]/1; MG/1; MG/1; MG/1; MG/1; MG/1; MG/1; UG/1; MG/1; MG/1; MG/1; MG/1
1 TABLET ORAL DAILY
Status: DISCONTINUED | OUTPATIENT
Start: 2024-04-20 | End: 2024-04-22 | Stop reason: HOSPADM

## 2024-04-20 RX ORDER — METHYLERGONOVINE MALEATE 0.2 MG/ML
200 INJECTION INTRAVENOUS ONCE AS NEEDED
Status: DISCONTINUED | OUTPATIENT
Start: 2024-04-20 | End: 2024-04-21

## 2024-04-20 RX ORDER — SODIUM CHLORIDE 0.9 % (FLUSH) 0.9 %
3 SYRINGE (ML) INJECTION
Status: DISCONTINUED | OUTPATIENT
Start: 2024-04-20 | End: 2024-04-21

## 2024-04-20 RX ORDER — OXYTOCIN/RINGER'S LACTATE 30/500 ML
95 PLASTIC BAG, INJECTION (ML) INTRAVENOUS ONCE AS NEEDED
Status: DISCONTINUED | OUTPATIENT
Start: 2024-04-20 | End: 2024-04-21

## 2024-04-20 RX ORDER — ONDANSETRON 4 MG/1
8 TABLET, ORALLY DISINTEGRATING ORAL EVERY 8 HOURS PRN
Status: DISCONTINUED | OUTPATIENT
Start: 2024-04-20 | End: 2024-04-22 | Stop reason: HOSPADM

## 2024-04-20 RX ORDER — CARBOPROST TROMETHAMINE 250 UG/ML
250 INJECTION, SOLUTION INTRAMUSCULAR
Status: DISCONTINUED | OUTPATIENT
Start: 2024-04-20 | End: 2024-04-22 | Stop reason: HOSPADM

## 2024-04-20 RX ORDER — OXYTOCIN 10 [USP'U]/ML
10 INJECTION, SOLUTION INTRAMUSCULAR; INTRAVENOUS ONCE AS NEEDED
Status: DISCONTINUED | OUTPATIENT
Start: 2024-04-20 | End: 2024-04-22 | Stop reason: HOSPADM

## 2024-04-20 RX ORDER — MISOPROSTOL 100 UG/1
800 TABLET ORAL ONCE AS NEEDED
Status: DISCONTINUED | OUTPATIENT
Start: 2024-04-20 | End: 2024-04-21

## 2024-04-20 RX ORDER — DIPHENHYDRAMINE HCL 25 MG
25 CAPSULE ORAL EVERY 4 HOURS PRN
Status: DISCONTINUED | OUTPATIENT
Start: 2024-04-20 | End: 2024-04-21

## 2024-04-20 RX ORDER — BISACODYL 10 MG/1
10 SUPPOSITORY RECTAL DAILY PRN
Status: DISCONTINUED | OUTPATIENT
Start: 2024-04-20 | End: 2024-04-22 | Stop reason: HOSPADM

## 2024-04-20 RX ORDER — HYDROCORTISONE 25 MG/G
CREAM TOPICAL 3 TIMES DAILY PRN
Status: DISCONTINUED | OUTPATIENT
Start: 2024-04-20 | End: 2024-04-22 | Stop reason: HOSPADM

## 2024-04-20 RX ORDER — DIPHENHYDRAMINE HYDROCHLORIDE 50 MG/ML
25 INJECTION INTRAMUSCULAR; INTRAVENOUS EVERY 4 HOURS PRN
Status: DISCONTINUED | OUTPATIENT
Start: 2024-04-20 | End: 2024-04-22 | Stop reason: HOSPADM

## 2024-04-20 RX ORDER — IBUPROFEN 400 MG/1
800 TABLET ORAL EVERY 8 HOURS
OUTPATIENT
Start: 2024-04-21

## 2024-04-20 RX ORDER — IBUPROFEN 600 MG/1
600 TABLET ORAL EVERY 6 HOURS PRN
Status: DISCONTINUED | OUTPATIENT
Start: 2024-04-20 | End: 2024-04-22 | Stop reason: HOSPADM

## 2024-04-20 RX ORDER — KETOROLAC TROMETHAMINE 30 MG/ML
30 INJECTION, SOLUTION INTRAMUSCULAR; INTRAVENOUS EVERY 8 HOURS
OUTPATIENT
Start: 2024-04-20 | End: 2024-04-21

## 2024-04-20 RX ORDER — DOCUSATE SODIUM 100 MG/1
200 CAPSULE, LIQUID FILLED ORAL DAILY
Status: DISCONTINUED | OUTPATIENT
Start: 2024-04-20 | End: 2024-04-22 | Stop reason: HOSPADM

## 2024-04-20 RX ORDER — TRANEXAMIC ACID 10 MG/ML
1000 INJECTION, SOLUTION INTRAVENOUS EVERY 30 MIN PRN
Status: DISCONTINUED | OUTPATIENT
Start: 2024-04-20 | End: 2024-04-22 | Stop reason: HOSPADM

## 2024-04-20 RX ORDER — OXYTOCIN/RINGER'S LACTATE 30/500 ML
334 PLASTIC BAG, INJECTION (ML) INTRAVENOUS ONCE AS NEEDED
Status: DISCONTINUED | OUTPATIENT
Start: 2024-04-20 | End: 2024-04-21

## 2024-04-20 RX ORDER — HYDROMORPHONE HYDROCHLORIDE 1 MG/ML
1 INJECTION, SOLUTION INTRAMUSCULAR; INTRAVENOUS; SUBCUTANEOUS EVERY 6 HOURS PRN
OUTPATIENT
Start: 2024-04-20

## 2024-04-20 RX ORDER — LANOLIN ALCOHOL/MO/W.PET/CERES
1 CREAM (GRAM) TOPICAL DAILY
Status: DISCONTINUED | OUTPATIENT
Start: 2024-04-20 | End: 2024-04-22 | Stop reason: HOSPADM

## 2024-04-20 RX ORDER — SIMETHICONE 80 MG
1 TABLET,CHEWABLE ORAL EVERY 6 HOURS PRN
Status: DISCONTINUED | OUTPATIENT
Start: 2024-04-20 | End: 2024-04-22 | Stop reason: HOSPADM

## 2024-04-20 RX ADMIN — MISOPROSTOL 800 MCG: 100 TABLET ORAL at 02:04

## 2024-04-20 RX ADMIN — PRENATAL VITAMINS-IRON FUMARATE 27 MG IRON-FOLIC ACID 0.8 MG TABLET 1 TABLET: at 09:04

## 2024-04-20 RX ADMIN — IBUPROFEN 600 MG: 600 TABLET, FILM COATED ORAL at 08:04

## 2024-04-20 RX ADMIN — IBUPROFEN 600 MG: 600 TABLET, FILM COATED ORAL at 04:04

## 2024-04-20 RX ADMIN — FERROUS SULFATE TAB 325 MG (65 MG ELEMENTAL FE) 1 EACH: 325 (65 FE) TAB at 09:04

## 2024-04-20 RX ADMIN — HYDROCODONE BITARTRATE AND ACETAMINOPHEN 1 TABLET: 7.5; 325 TABLET ORAL at 06:04

## 2024-04-20 RX ADMIN — HYDROCODONE BITARTRATE AND ACETAMINOPHEN 1 TABLET: 7.5; 325 TABLET ORAL at 09:04

## 2024-04-20 RX ADMIN — BENZOCAINE AND LEVOMENTHOL: 200; 5 SPRAY TOPICAL at 02:04

## 2024-04-20 RX ADMIN — DOCUSATE SODIUM 200 MG: 100 CAPSULE, LIQUID FILLED ORAL at 08:04

## 2024-04-20 RX ADMIN — HYDROCODONE BITARTRATE AND ACETAMINOPHEN 1 TABLET: 7.5; 325 TABLET ORAL at 01:04

## 2024-04-20 NOTE — ANESTHESIA POSTPROCEDURE EVALUATION
Anesthesia Post Evaluation    Patient: Laury Garrett    Procedure(s) Performed: * No procedures listed *    Final Anesthesia Type: epidural      Patient location during evaluation: labor & delivery  Patient participation: Yes- Able to Participate  Level of consciousness: awake and alert, awake and oriented  Post-procedure vital signs: reviewed and stable  Pain management: adequate  Airway patency: patent    PONV status at discharge: No PONV  Anesthetic complications: no      Cardiovascular status: blood pressure returned to baseline  Respiratory status: unassisted, room air and spontaneous ventilation  Hydration status: euvolemic  Follow-up not needed.              Vitals Value Taken Time   /70 04/20/24 0057   Temp 36.9 °C (98.4 °F) 04/19/24 2237   Pulse 82 04/20/24 0104   Resp 18 04/19/24 1958   SpO2 99 % 04/20/24 0101   Vitals shown include unfiled device data.      No case tracking events are documented in the log.      Pain/Yancy Score: No data recorded

## 2024-04-20 NOTE — L&D DELIVERY NOTE
[facility]  Vaginal Delivery   Operative Note    SUMMARY   18 y.o. female  at 40w1d with diet-controlled gestational diabetes presented at 40 weeks 4 days for induction.  Pitocin was initiated.  Blood sugars were normal.  Fetal testing was largely reactive but she did have an occasional decel had a prolonged fetal heart rate decelerations associated with an episode of tachy systole.  This resolved with resuscitative maneuvers and terbutaline.  Membranes were ruptured artificially at 14 50.  After membrane rupture patient experienced an episode fetal heart rate decelerations requiring resuscitative maneuvers.  Variability remained moderate.  She did continued to have occasional fetal heart rate decelerations.  However she did progress normally in labor.  She reached complete dilation at 11:52 p.m..  Pushing shortly after midnight.  She pushed well with good effort.  Delivered a viable female infant via spontaneous vaginal birth at 12:56 a.m.    Normal spontaneous vaginal delivery of live infant, was placed on mothers abdomen for skin to skin and bulb suctioning performed.  Infant delivered position CARLA over intact perineum.  Nuchal cord: No.    Spontaneous delivery of placenta and IV pitocin given noting good uterine tone.  1st degree laceration noted and left periurethral laceration noted.  Each repaired with 2-0 Chromic gut suture with good hemostasis.  Patient tolerated delivery well. Sponge needle and lap counted correctly x2.    Indications: Diet controlled gestational diabetes mellitus (GDM) in third trimester  Pregnancy complicated by:   Patient Active Problem List   Diagnosis    Susceptible to Varicella (non-immune), currently pregnant in third trimester    Ear ache    Diet controlled gestational diabetes mellitus (GDM) in third trimester    40 weeks gestation of pregnancy     Admitting GA: 40w1d    Delivery Information for Sophie Garrett    Birth information:  Date of  "birth: 2026   Time of birth: 12:56 AM   Sex: female   Head Delivery Date/Time: 2024 12:56 AM   Delivery type: Vaginal, Spontaneous   Gestational Age: 40w1d        Delivery Providers    Delivering clinician: Rishi Hilario MD   Provider Role    Mary Rider RN Arceneaux, Skyler, RN               Measurements    Weight: 3220 g  Weight (lbs): 7 lb 1.6 oz  Length: 48.3 cm  Length (in): 19"  Head circumference: 33.7 cm         Apgars    Living status: Living  Apgar Component Scores:  1 min.:  5 min.:  10 min.:  15 min.:  20 min.:    Skin color:  1  1       Heart rate:  2  2       Reflex irritability:  2  2       Muscle tone:  2  2       Respiratory effort:  2  2       Total:  9  9       Apgars assigned by: GRISELDA DUENAS RN         Operative Delivery    Forceps attempted?: No  Vacuum extractor attempted?: No         Shoulder Dystocia    Shoulder dystocia present?: No           Presentation    Presentation: Vertex  Position: Left Occiput Anterior           Interventions/Resuscitation    Method: Bulb Suctioning, Tactile Stimulation, Deep Suctioning       Cord    Vessels: 3 vessels  Complications: None  Delayed Cord Clamping?: Yes  Cord Clamped Date/Time: 2024 12:58 AM  Cord Blood Disposition: Lab  Gases Sent?: Yes  Stem Cell Collection (by MD): No       Placenta    Placenta delivery date/time: 2024 0059  Placenta removal: Spontaneous  Placenta appearance: Intact  Placenta disposition: Discarded           Labor Events:       labor: No     Labor Onset Date/Time: 2024 14:50     Dilation Complete Date/Time: 2024 23:52     Start Pushing Date/Time: 2024 00:13     Rupture Date/Time: 24  1450         Rupture type: ARM (Artificial Rupture)         Fluid Amount:       Fluid Color: Clear       steroids: None     Antibiotics given for GBS: No     Induction: oxytocin     Indications for induction:  Post-term Gestation     Augmentation: amniotomy     Indications for " augmentation: Ineffective Contraction Pattern     Labor complications: None     Additional complications:          Cervical ripening:                     Delivery:      Episiotomy: None     Indication for Episiotomy:       Perineal Lacerations: 2nd Repaired:  Yes   Periurethral Laceration: left Repaired: Yes   Labial Laceration:   Repaired:     Sulcus Laceration:   Repaired:     Vaginal Laceration:   Repaired:     Cervical Laceration:   Repaired:     Repair suture:       Repair # of packets: 2     Last Value - EBL - Nursing (mL):       Sum - EBL - Nursing (mL): 0     Last Value - EBL - Anesthesia (mL):      Calculated QBL (mL): 300      Running total QBL (mL): 546      Vaginal Sweep Performed:       Surgicount Correct: Yes       Other providers:       Anesthesia    Method: Epidural          Details (if applicable):  Trial of Labor      Categorization:      Priority:     Indications for :     Incision Type:       Additional  information:  Forceps:    Vacuum:    Breech:    Observed anomalies    Other (Comments):

## 2024-04-21 PROCEDURE — 99231 SBSQ HOSP IP/OBS SF/LOW 25: CPT | Mod: ,,, | Performed by: OBSTETRICS & GYNECOLOGY

## 2024-04-21 PROCEDURE — 11000001 HC ACUTE MED/SURG PRIVATE ROOM

## 2024-04-21 PROCEDURE — 25000003 PHARM REV CODE 250: Performed by: OBSTETRICS & GYNECOLOGY

## 2024-04-21 RX ORDER — FERROUS SULFATE 325(65) MG
325 TABLET, DELAYED RELEASE (ENTERIC COATED) ORAL DAILY
Qty: 30 TABLET | Refills: 1 | Status: SHIPPED | OUTPATIENT
Start: 2024-04-21

## 2024-04-21 RX ORDER — IBUPROFEN 600 MG/1
600 TABLET ORAL EVERY 6 HOURS PRN
Qty: 120 TABLET | Refills: 2 | Status: SHIPPED | OUTPATIENT
Start: 2024-04-21

## 2024-04-21 RX ADMIN — HYDROCODONE BITARTRATE AND ACETAMINOPHEN 1 TABLET: 7.5; 325 TABLET ORAL at 07:04

## 2024-04-21 RX ADMIN — FERROUS SULFATE TAB 325 MG (65 MG ELEMENTAL FE) 1 EACH: 325 (65 FE) TAB at 08:04

## 2024-04-21 RX ADMIN — IBUPROFEN 600 MG: 600 TABLET, FILM COATED ORAL at 10:04

## 2024-04-21 RX ADMIN — IBUPROFEN 600 MG: 600 TABLET, FILM COATED ORAL at 03:04

## 2024-04-21 RX ADMIN — DOCUSATE SODIUM 200 MG: 100 CAPSULE, LIQUID FILLED ORAL at 10:04

## 2024-04-21 RX ADMIN — PRENATAL VITAMINS-IRON FUMARATE 27 MG IRON-FOLIC ACID 0.8 MG TABLET 1 TABLET: at 08:04

## 2024-04-21 NOTE — HOSPITAL COURSE
Postpartum course has been uneventful.  Pain is controlled.  Bleeding is minimal.  She desires discharge home today.

## 2024-04-21 NOTE — PROGRESS NOTES
[facility]  Obstetrics  Postpartum Progress Note    Patient Name: Laury Garrett  MRN: [id number]  Admission Date: 2024  Hospital Length of Stay: 2 days  Attending Physician: Rishi Hilario MD  Primary Care Provider: Carlos Fay MD    Subjective:     Principal Problem:Normal vaginal delivery    Hospital course: s/p  PP day 1    Interval History:     She is doing well this morning. She is tolerating a regular diet without nausea or vomiting. She is voiding spontaneously. She is ambulating. She has passed flatus, and has not a BM. Vaginal bleeding is mild. She denies fever or chills. Abdominal pain is moderate and controlled with oral medications. She Is not breastfeeding.    Objective:     Vital Signs (Most Recent):  Temp: 97.5 °F (36.4 °C) (24 0740)  Pulse: 70 (24 0740)  Resp: 16 (24 0740)  BP: 103/68 (24 0740)  SpO2: 100 % (24 0740) Vital Signs (24h Range):  Temp:  [97 °F (36.1 °C)-98.3 °F (36.8 °C)] 97.5 °F (36.4 °C)  Pulse:  [70-90] 70  Resp:  [16-20] 16  SpO2:  [100 %] 100 %  BP: (103-124)/(68-82) 103/68        There is no height or weight on file to calculate BMI.    No intake or output data in the 24 hours ending 24 0949    Physical Exam:   Constitutional: She is oriented to person, place, and time. She appears well-nourished. No distress.     Eyes: Conjunctivae and EOM are normal.     Cardiovascular:       Exam reveals no clubbing, no cyanosis and no edema.        Pulmonary/Chest: Effort normal. No respiratory distress.        Abdominal: Soft. She exhibits no distension. There is no abdominal tenderness. There is no rebound and no guarding.     Genitourinary:    Vagina normal.                 Neurological: She is alert and oriented to person, place, and time.    Skin: Skin is warm and dry. She is not diaphoretic. No cyanosis. Nails show no clubbing.    Psychiatric: She has a normal mood and affect. Her behavior is normal. Judgment and  "thought content normal.       Significant Labs:  No results found for: "GROUPTRH", "HEPBSAG", "RUBELLAIGGSC", "STREPBCULT", "AFP", "MBHFWXC1FW"  Recent Labs   Lab 24  0653   HGB 10.3*   HCT 29.5*       I have personallly reviewed all pertinent lab results from the last 24 hours.  Recent Lab Results       None            Assessment/Plan:     18 y.o. female  at 40w1d for:    Active Diagnoses:    Diagnosis Date Noted POA    PRINCIPAL PROBLEM:  Normal vaginal delivery [O80] 2024 Not Applicable    Postpartum anemia [O90.81] 2024 No    40 weeks gestation of pregnancy [Z3A.40] 2024 Not Applicable    Diet controlled gestational diabetes mellitus (GDM) in third trimester [O24.410] 2024 Yes    Susceptible to Varicella (non-immune), currently pregnant in third trimester [O09.893, Z28.39] 10/09/2023 Not Applicable      Problems Resolved During this Admission:       Patient requests to stay an additional day.    Continue pain control.    Ambulate.    Iron for anemia  Anticipate discharge tomorrow if stable.    JORGE CORRALES MD  Obstetrics  [facility]  "

## 2024-04-22 VITALS
SYSTOLIC BLOOD PRESSURE: 102 MMHG | HEIGHT: 61 IN | BODY MASS INDEX: 25.86 KG/M2 | TEMPERATURE: 98 F | HEART RATE: 64 BPM | OXYGEN SATURATION: 99 % | RESPIRATION RATE: 18 BRPM | WEIGHT: 137 LBS | DIASTOLIC BLOOD PRESSURE: 60 MMHG

## 2024-04-22 PROCEDURE — 25000003 PHARM REV CODE 250: Performed by: OBSTETRICS & GYNECOLOGY

## 2024-04-22 PROCEDURE — 99238 HOSP IP/OBS DSCHRG MGMT 30/<: CPT | Mod: ,,, | Performed by: OBSTETRICS & GYNECOLOGY

## 2024-04-22 RX ADMIN — FERROUS SULFATE TAB 325 MG (65 MG ELEMENTAL FE) 1 EACH: 325 (65 FE) TAB at 09:04

## 2024-04-22 RX ADMIN — PRENATAL VITAMINS-IRON FUMARATE 27 MG IRON-FOLIC ACID 0.8 MG TABLET 1 TABLET: at 09:04

## 2024-04-22 NOTE — DISCHARGE SUMMARY
"[facility]  Obstetrics  Discharge Summary      Patient Name: Laury Garrett  MRN: [id number]  Admission Date: 2024  Hospital Length of Stay: 3 days  Discharge Date and Time:  2024 7:35 AM  Attending Physician: Rishi Hilario MD   Discharging Provider: RISHI HILARIO MD   Primary Care Provider: Carlos Fay MD    HPI: 18 y.o. female  at 40w1d presented for elective term induction. She delivered a viable infant via  early in the morning of 24.         Hospital Course:   Postpartum course has been uneventful.  Pain is controlled.  Bleeding is minimal.  She desires discharge home today.          Final Active Diagnoses:    Diagnosis Date Noted POA    PRINCIPAL PROBLEM:  Normal vaginal delivery [O80] 2024 Not Applicable    Postpartum anemia [O90.81] 2024 No    40 weeks gestation of pregnancy [Z3A.40] 2024 Not Applicable    Diet controlled gestational diabetes mellitus (GDM) in third trimester [O24.410] 2024 Yes    Susceptible to Varicella (non-immune), currently pregnant in third trimester [O09.893, Z28.39] 10/09/2023 Not Applicable      Problems Resolved During this Admission:        Significant Diagnostic Studies: Labs: CMP No results for input(s): "NA", "K", "CL", "CO2", "GLU", "BUN", "CREATININE", "CALCIUM", "PROT", "ALBUMIN", "BILITOT", "ALKPHOS", "AST", "ALT", "ANIONGAP", "ESTGFRAFRICA", "EGFRNONAA" in the last 48 hours. and CBC No results for input(s): "WBC", "HGB", "HCT", "PLT" in the last 48 hours.      Feeding Method: bottle    Immunizations       Date Immunization Status Dose Route/Site Given by    07 0000 MMRV Given  Subcutaneous/Right quadriceps     14 0000 Meningococcal Conjugate (MCV4P) Given  Intramuscular/Right arm     20 0000 Meningococcal Conjugate (MCV4P) Given       04 0000 MMR Given       07 0000 IPV Given  Subcutaneous/Left quadriceps     14 0000 HPV Quadrivalent Given  Intramuscular/Right " "arm     08/26/13 0000 HPV Quadrivalent Given  Intramuscular/Left arm     09/23/03 0000 HIB Given       11/24/03 0000 HIB Given       01/23/04 0000 HIB Given       09/28/04 0000 HIB Given       09/23/03 0000 Hepatitis B, Pediatric/Adolescent Given       10/28/04 0000 Hepatitis B, Pediatric/Adolescent Given       07/25/03 0000 Hepatitis B, Pediatric/Adolescent Given       11/24/03 0000 Hepatitis B, Pediatric/Adolescent Given       01/30/20 0000 Hepatitis A, Adult Given       07/24/07 0000 DTaP Given  Intramuscular/Left quadriceps     01/23/04 0000 DTaP Given       11/24/03 0000 DTaP Given       09/23/03 0000 DTaP Given       10/28/04 0000 DTaP Given       09/28/04 0000 Varicella Given       07/29/14 0000 Tdap Given  Intramuscular/Left arm     11/24/03 0000 IPV Given       09/23/03 0000 IPV Given       09/28/04 0000 IPV Given       07/24/06 0000 Pneumococcal Conjugate - 7 Valent Given  Intramuscular/Right quadriceps     11/24/03 0000 Pneumococcal Conjugate - 7 Valent Given       01/23/04 0000 Pneumococcal Conjugate - 7 Valent Given       09/23/03 0000 Pneumococcal Conjugate - 7 Valent Given       02/02/10 0000 Influenza A (H1N1) 2009 Monovalent - Intranasal Given       01/04/10 0000 Influenza A (H1N1) 2009 Monovalent - Intranasal Given       01/15/15 0000 Influenza (Flumist) - Quadrivalent - Intranasal *Preferred* (2-49 years old) Given       04/21/24 1757 MMR Deleted 0.5 mL Subcutaneous/     04/20/24 0209 Tdap Incomplete 0.5 mL Intramuscular/             Delivery:    Episiotomy: None   Lacerations: 2nd   Repair suture:     Repair # of packets: 2   Blood loss (ml):       Birth information:  YOB: 2026   Time of birth: 12:56 AM   Sex: female   Delivery type: Vaginal, Spontaneous   Gestational Age: 40w1d     Measurements    Weight: 3220 g  Weight (lbs): 7 lb 1.6 oz  Length: 48.3 cm  Length (in): 19"  Head circumference: 33.7 cm         Delivery Clinician: Delivery Providers    Delivering clinician: Yanelis" MD Rishi   Provider Role    Mary Rider RN Arceneaux, Skyler, RN              Additional  information:  Forceps:    Vacuum:    Breech:    Observed anomalies      Living?:     Apgars    Living status: Living  Apgar Component Scores:  1 min.:  5 min.:  10 min.:  15 min.:  20 min.:    Skin color:  1  1       Heart rate:  2  2       Reflex irritability:  2  2       Muscle tone:  2  2       Respiratory effort:  2  2       Total:  9  9       Apgars assigned by: GRISELDA DUENAS RN         Placenta: Delivered:       appearance  Pending Diagnostic Studies:       None            Discharged Condition: good    Disposition: Home or Self Care    Follow Up:   Follow-up Information       Rishi Hilario MD Follow up in 6 week(s).    Specialty: Obstetrics and Gynecology  Contact information:  [address] [place] [id number] [phone]                           Patient Instructions:      Glucose, fasting   Standing Status: Future Standing Exp. Date: 25   Order Comments: Schedule 6 weeks after delivery     Glucose Tolerance 2 Hour   Standing Status: Future Standing Exp. Date: 25   Order Comments: Schedule 6 weeks after delivery     Medications:  Current Discharge Medication List        START taking these medications    Details   ferrous sulfate 325 (65 FE) MG EC tablet Take 1 tablet (325 mg total) by mouth once daily.  Qty: 30 tablet, Refills: 1      ibuprofen (ADVIL,MOTRIN) 600 MG tablet Take 1 tablet (600 mg total) by mouth every 6 (six) hours as needed for Pain.  Qty: 120 tablet, Refills: 2           CONTINUE these medications which have NOT CHANGED    Details   prenatal no115/iron/folic acid (PRENATAL 19 ORAL) Take 1 tablet by mouth once daily.           STOP taking these medications       blood sugar diagnostic Strp Comments:   Reason for Stopping:         blood-glucose meter kit Comments:   Reason for Stopping:         lancets (ACCU-CHEK SOFTCLIX LANCETS) Misc Comments:   Reason for Stopping:           No  lifting more than 20 lbs and no intercourse for 6 weeks.  Sitz baths BID  Pain, fever, bleeding, pre-eclampsia, thromboembolism, and postpartum depression precautions are given.       JORGE CORRALES MD  Obstetrics  [facility]

## 2024-05-01 ENCOUNTER — PATIENT MESSAGE (OUTPATIENT)
Dept: OBSTETRICS AND GYNECOLOGY | Facility: CLINIC | Age: 19
End: 2024-05-01
Payer: MEDICAID

## 2024-05-30 ENCOUNTER — PATIENT MESSAGE (OUTPATIENT)
Dept: OBSTETRICS AND GYNECOLOGY | Facility: CLINIC | Age: 19
End: 2024-05-30

## 2024-06-07 NOTE — PROGRESS NOTES
Subjective:       Laury Garrett is a 18 y.o. female  status post  (24) presents for her 6 week postpartum visit.  Infant doing well, bottle feeding.  C/o feeling sad, overwhelmed, detached from baby. She also feels anxious and cries frequently  Family members have noticed a change as well. Denies SI, HI. Able to care for self, baby. Wants to discuss contraceptive management.     The patient's history was reviewed and updated.    Gyn History:    Menstrual History   Cycle: Yes  Menarche Age: 7 years  Flow Duration:  (currently on cycle)  Flow: Normal  Interval:  (first cycle since giving birth)  Intermenstrual Bleeding: No  Dysmenorrhea: No  [place]  Sexually Active: Yes  Sexual Orientation: heterosexual  Postcoital Bleeding: No  Dyspareunia: No  STI History: Yes  STI Type: Chlamydia  Contraception: No  Menopause  Menopause Age: 1 years  Breast History  Last Breast Imaging Date: No  History of Breast Biopsy: No  Pap History   Last pap date:  (never had one)  HPV Vaccine Completed: No (2/3)    Review of Systems  General/Constitutional: Chills denies. Fatigue/weakness denies. Fever denies . Night sweats denies . Hot flashes denies  Gastrointestinal: Abdominal pain denies. Blood in stool denies. Constipation denies. Diarrhea denies. Heartburn denies. Nausea denies. Vomiting denies   Genitourinary: Incontinence denies. Blood in urine denies. Frequent urination denies.  Urgency denies. Painful urination denies. Nocturia denies   Gynecologic: Irregular menses denies.  Heavy bleeding  denies.  Painful menses denies.  Vaginal discharge denies. Vaginal odor denies. Vaginal itching/Irritation denies. Vaginal lesion denies.  Pelvic pain denies. Decreased libido denies. Vulvar lesion denies. Prolapse of genital organs denies. Painful intercourse denies. Postcoital bleeding denies   Psychiatric: Mood lability denies.  Depressed mood admits . Suicidal thoughts denies. Anxiety admits.  Overwhelmed admits.   Appetite normal. Energy level decreased        Physical Exam:   /62 (BP Location: Right arm, Patient Position: Sitting, BP Method: Medium (Manual))   Temp 97.2 °F (36.2 °C) (Temporal)   Wt 55.5 kg (122 lb 6.4 oz)   LMP 06/04/2024 (Exact Date)   Breastfeeding No   BMI 23.13 kg/m²      Chaperone present.     Constitutional: General appearance: healthy, well-nourished and well-developed   Psychiatric: Orientation to time, place and person. Normal mood and affect and        active, alert   Breast: Inspection/palpation: no tenderness, masses, skin changes or abnormal secretions   Abdomen:  Auscultation/Inspection/Palpation: deferred   Female Genitalia:   Deferred per pt    Assessment:     1. Postpartum exam    2. Initial encounter for management of contraceptive patch use  -     POCT urine pregnancy  -     norelgestromin-ethinyl estradiol 150-35 mcg/24 hr; Place 1 patch onto the skin every 7 days.  Dispense: 4 patch; Refill: 11    3. Post partum depression  -     sertraline (ZOLOFT) 25 MG tablet; Take 1 tablet (25 mg total) by mouth once daily.  Dispense: 30 tablet; Refill: 11         Plan:   May return to normal activities  Healthy diet and exercise  desires contraception at this time    Discussed with patient contraceptive options including natural family planning, barrier, oral contraceptives, patch, NuvaRing, Depo-Provera, Nexplanon, IUDs, abstinence.     Safe sex education given. Discussed with patient that birth control options discussed above do not protect against STDs.   Patient desires: Xulane patch   UPT in clinic today: negative    Discussed anxiety and depression in detail.   Discussed treatment options including life style modification- healthy diet and exercise, spending time outdoors. Discussed medications and all associated risks and side effects of SSRI's. Discussed counseling. Strict precautions. Pt desires to begin medication  Recommend she seek counseling as well.  Rx Zoloft 25mg  daily    RTC 1 month medication f/u     This note was transcribed by Stephani Claros. There may be transcription errors as a result, however minimal. Effort has been made to ensure accuracy of transcription, but any obvious errors or omissions should be clarified with the author of the document.

## 2024-06-11 ENCOUNTER — POSTPARTUM VISIT (OUTPATIENT)
Dept: OBSTETRICS AND GYNECOLOGY | Facility: CLINIC | Age: 19
End: 2024-06-11
Payer: MEDICAID

## 2024-06-11 VITALS
SYSTOLIC BLOOD PRESSURE: 106 MMHG | DIASTOLIC BLOOD PRESSURE: 62 MMHG | TEMPERATURE: 97 F | BODY MASS INDEX: 23.13 KG/M2 | WEIGHT: 122.38 LBS

## 2024-06-11 DIAGNOSIS — Z30.45 INITIAL ENCOUNTER FOR MANAGEMENT OF CONTRACEPTIVE PATCH USE: ICD-10-CM

## 2024-06-11 LAB
B-HCG UR QL: NEGATIVE
CTP QC/QA: YES

## 2024-06-11 PROCEDURE — 81025 URINE PREGNANCY TEST: CPT | Mod: ,,, | Performed by: OBSTETRICS & GYNECOLOGY

## 2024-06-11 RX ORDER — SERTRALINE HYDROCHLORIDE 25 MG/1
25 TABLET, FILM COATED ORAL DAILY
Qty: 30 TABLET | Refills: 11 | Status: SHIPPED | OUTPATIENT
Start: 2024-06-11 | End: 2025-06-11

## 2024-06-11 RX ORDER — NORELGESTROMIN AND ETHINYL ESTRADIOL 35; 150 UG/MG; UG/MG
1 PATCH TRANSDERMAL
Qty: 4 PATCH | Refills: 11 | Status: SHIPPED | OUTPATIENT
Start: 2024-06-11 | End: 2025-06-11

## 2024-07-09 ENCOUNTER — PATIENT MESSAGE (OUTPATIENT)
Dept: OBSTETRICS AND GYNECOLOGY | Facility: CLINIC | Age: 19
End: 2024-07-09

## 2025-01-08 ENCOUNTER — PATIENT MESSAGE (OUTPATIENT)
Dept: OBSTETRICS AND GYNECOLOGY | Facility: CLINIC | Age: 20
End: 2025-01-08
Payer: MEDICAID

## 2025-01-09 ENCOUNTER — OFFICE VISIT (OUTPATIENT)
Dept: OBSTETRICS AND GYNECOLOGY | Facility: CLINIC | Age: 20
End: 2025-01-09
Payer: MEDICAID

## 2025-01-09 VITALS
BODY MASS INDEX: 23.45 KG/M2 | DIASTOLIC BLOOD PRESSURE: 60 MMHG | SYSTOLIC BLOOD PRESSURE: 110 MMHG | HEIGHT: 61 IN | WEIGHT: 124.19 LBS | TEMPERATURE: 97 F

## 2025-01-09 DIAGNOSIS — N30.01 ACUTE CYSTITIS WITH HEMATURIA: Primary | ICD-10-CM

## 2025-01-09 DIAGNOSIS — R31.9 HEMATURIA, UNSPECIFIED TYPE: ICD-10-CM

## 2025-01-09 PROBLEM — O24.410 DIET CONTROLLED GESTATIONAL DIABETES MELLITUS (GDM) IN THIRD TRIMESTER: Status: RESOLVED | Noted: 2024-02-01 | Resolved: 2025-01-09

## 2025-01-09 PROBLEM — Z28.39 SUSCEPTIBLE TO VARICELLA (NON-IMMUNE), CURRENTLY PREGNANT IN THIRD TRIMESTER: Status: RESOLVED | Noted: 2023-10-09 | Resolved: 2025-01-09

## 2025-01-09 PROBLEM — Z3A.40 40 WEEKS GESTATION OF PREGNANCY: Status: RESOLVED | Noted: 2024-04-19 | Resolved: 2025-01-09

## 2025-01-09 PROBLEM — O09.893 SUSCEPTIBLE TO VARICELLA (NON-IMMUNE), CURRENTLY PREGNANT IN THIRD TRIMESTER: Status: RESOLVED | Noted: 2023-10-09 | Resolved: 2025-01-09

## 2025-01-09 LAB
BILIRUB UR QL STRIP: NEGATIVE
GLUCOSE UR QL STRIP: NEGATIVE
KETONES UR QL STRIP: NEGATIVE
LEUKOCYTE ESTERASE UR QL STRIP: POSITIVE
PH, POC UA: 5.5
POC BLOOD, URINE: POSITIVE
POC NITRATES, URINE: POSITIVE
PROT UR QL STRIP: POSITIVE
SP GR UR STRIP: 1.03 (ref 1–1.03)
UROBILINOGEN UR STRIP-ACNC: 1 (ref 0.1–1.1)

## 2025-01-09 PROCEDURE — 87086 URINE CULTURE/COLONY COUNT: CPT | Performed by: OBSTETRICS & GYNECOLOGY

## 2025-01-09 RX ORDER — SULFAMETHOXAZOLE AND TRIMETHOPRIM 800; 160 MG/1; MG/1
1 TABLET ORAL 2 TIMES DAILY
Qty: 14 TABLET | Refills: 0 | Status: SHIPPED | OUTPATIENT
Start: 2025-01-09 | End: 2025-01-16

## 2025-01-09 NOTE — PROGRESS NOTES
Chief Complaint     Urinary Tract Infection    HPI:     Patient is a 19 y.o.  presents today with complaints of hematuria x 1 day. Denies dysuria, fever.     Gyn History:    Menstrual History  Cycle: Yes (2024)  Menarche Age: 7 years  Flow Duration: 7  Flow: (!) Heavy  Interval: 28  Intermenstrual Bleeding: Yes  Dysmenorrhea: Yes  Dysmenorrhea Severity : Moderate    Menopause  Menopause Age: 1 years    Pap History  Last pap date:  (never had one)  HPV Vaccine Completed: Yes  HPV Vaccine Injection Type: 2 Injection Series    [place]  Sexually Active: Yes  Sexual Orientation: heterosexual  Postcoital Bleeding: No  Dyspareunia: No  STI History: Yes  STI Type: Chlamydia  Contraception: No    Breast History  Last Breast Imaging Date: No  History of Breast Biopsy: No          Past Medical History:   Diagnosis Date    Chlamydia infection        History reviewed. No pertinent surgical history.    Family History   Problem Relation Name Age of Onset    Ovarian cancer Maternal Grandmother Monica Perez 46    Breast cancer Neg Hx      Uterine cancer Neg Hx      Colon cancer Neg Hx      Cervical cancer Neg Hx         OB History          1    Para   1    Term   1       0    AB   0    Living   1         SAB   0    IAB   0    Ectopic   0    Multiple   0    Live Births   1                 Current Outpatient Medications on File Prior to Visit   Medication Sig Dispense Refill    [DISCONTINUED] ferrous sulfate 325 (65 FE) MG EC tablet Take 1 tablet (325 mg total) by mouth once daily. (Patient not taking: Reported on 2024) 30 tablet 1    [DISCONTINUED] ibuprofen (ADVIL,MOTRIN) 600 MG tablet Take 1 tablet (600 mg total) by mouth every 6 (six) hours as needed for Pain. (Patient not taking: Reported on 2024) 120 tablet 2    [DISCONTINUED] norelgestromin-ethinyl estradiol 150-35 mcg/24 hr Place 1 patch onto the skin every 7 days. 4 patch 11    [DISCONTINUED] prenatal no115/iron/folic acid (PRENATAL  "19 ORAL) Take 1 tablet by mouth once daily. (Patient not taking: Reported on 1/9/2025)      [DISCONTINUED] sertraline (ZOLOFT) 25 MG tablet Take 1 tablet (25 mg total) by mouth once daily. 30 tablet 11     No current facility-administered medications on file prior to visit.       Review of Systems:       Review of Systems   Constitutional:  Negative for chills and fever.   Gastrointestinal:  Negative for abdominal pain, constipation and diarrhea.   Genitourinary:  Positive for dysuria. Negative for bladder incontinence, decreased libido, dysmenorrhea, dyspareunia, flank pain, frequency, genital sores, hematuria, hot flashes, menorrhagia, menstrual problem, pelvic pain, urgency, vaginal bleeding, vaginal discharge, vaginal pain, urinary incontinence, postcoital bleeding, postmenopausal bleeding, vaginal dryness and vaginal odor.        Physical Exam:    /60 (BP Location: Left arm, Patient Position: Sitting)   Temp 97.3 °F (36.3 °C) (Temporal)   Ht 5' 1" (1.549 m)   Wt 56.3 kg (124 lb 3.2 oz)   LMP 12/16/2024 (Exact Date)   Breastfeeding No   BMI 23.47 kg/m²     Physical Exam   General Exam:    General Appearance: alert, in no acute distress, normal, well nourished.  Psych:  Orientation: time, place, person.  Mood/Affect: Normal       Assessment:   1. Acute cystitis with hematuria  -     POCT Urinalysis, Dipstick, Automated, W/O Scope    2. Hematuria, unspecified type  -     Urine Culture High Risk             Plan:     UA: +blood, +protein, +leukocytes  Urine culture collected  PO hydration, strict precautions   Rx Bactrim BID x7 days   To call if no improvement     RTC prn/annual     This note was transcribed by Stephani Claros. There may be transcription errors as a result, however minimal. Effort has been made to ensure accuracy of transcription, but any obvious errors or omissions should be clarified with the author of the document.       I agree with the above documentation.         "

## 2025-01-11 LAB — BACTERIA UR CULT: NORMAL
